# Patient Record
Sex: MALE | Race: WHITE | NOT HISPANIC OR LATINO | Employment: FULL TIME | URBAN - METROPOLITAN AREA
[De-identification: names, ages, dates, MRNs, and addresses within clinical notes are randomized per-mention and may not be internally consistent; named-entity substitution may affect disease eponyms.]

---

## 2022-04-13 ENCOUNTER — OCCUPATIONAL HEALTH (OUTPATIENT)
Dept: URGENT CARE | Facility: CLINIC | Age: 32
End: 2022-04-13
Payer: COMMERCIAL

## 2022-04-13 DIAGNOSIS — Z23 ENCOUNTER FOR IMMUNIZATION: Primary | ICD-10-CM

## 2022-04-13 PROCEDURE — 90746 HEPATITIS B VACCINE ADULT IM: ICD-10-PCS | Mod: S$GLB,,,

## 2022-04-13 PROCEDURE — 90632 HEPA VACCINE ADULT IM: CPT | Mod: S$GLB,,,

## 2022-04-13 PROCEDURE — 90746 HEPB VACCINE 3 DOSE ADULT IM: CPT | Mod: S$GLB,,,

## 2022-04-13 PROCEDURE — 90632 HEPATITIS A VACCINE ADULT IM: ICD-10-PCS | Mod: S$GLB,,,

## 2022-05-13 ENCOUNTER — OCCUPATIONAL HEALTH (OUTPATIENT)
Dept: URGENT CARE | Facility: CLINIC | Age: 32
End: 2022-05-13

## 2022-05-13 DIAGNOSIS — Z13.9 ENCOUNTER FOR SCREENING: Primary | ICD-10-CM

## 2022-05-13 PROCEDURE — 90746 HEPATITIS B VACCINE ADULT IM: ICD-10-PCS | Mod: S$GLB,,, | Performed by: NURSE PRACTITIONER

## 2022-05-13 PROCEDURE — 90746 HEPB VACCINE 3 DOSE ADULT IM: CPT | Mod: S$GLB,,, | Performed by: NURSE PRACTITIONER

## 2022-09-06 ENCOUNTER — OFFICE VISIT (OUTPATIENT)
Dept: URGENT CARE | Facility: CLINIC | Age: 32
End: 2022-09-06
Payer: COMMERCIAL

## 2022-09-06 VITALS
HEIGHT: 71 IN | SYSTOLIC BLOOD PRESSURE: 147 MMHG | WEIGHT: 315 LBS | RESPIRATION RATE: 20 BRPM | OXYGEN SATURATION: 96 % | BODY MASS INDEX: 44.1 KG/M2 | HEART RATE: 89 BPM | DIASTOLIC BLOOD PRESSURE: 98 MMHG | TEMPERATURE: 99 F

## 2022-09-06 DIAGNOSIS — Z23 NEED FOR TETANUS BOOSTER: ICD-10-CM

## 2022-09-06 DIAGNOSIS — S60.312A ABRASION OF LEFT THUMB, INITIAL ENCOUNTER: Primary | ICD-10-CM

## 2022-09-06 LAB — BREATH ALCOHOL: 0

## 2022-09-06 PROCEDURE — 90714 TD VACC NO PRESV 7 YRS+ IM: CPT | Mod: AT,S$GLB,, | Performed by: EMERGENCY MEDICINE

## 2022-09-06 PROCEDURE — 82075 POCT BREATH ALCOHOL TEST: ICD-10-PCS | Mod: S$GLB,,, | Performed by: EMERGENCY MEDICINE

## 2022-09-06 PROCEDURE — 90471 IMMUNIZATION ADMIN: CPT | Mod: AT,S$GLB,, | Performed by: EMERGENCY MEDICINE

## 2022-09-06 PROCEDURE — 80305 DRUG TEST PRSMV DIR OPT OBS: CPT | Mod: QW,S$GLB,, | Performed by: EMERGENCY MEDICINE

## 2022-09-06 PROCEDURE — 80305 OOH COLLECTION ONLY DRUG SCREEN: ICD-10-PCS | Mod: QW,S$GLB,, | Performed by: EMERGENCY MEDICINE

## 2022-09-06 PROCEDURE — 99203 PR OFFICE/OUTPT VISIT, NEW, LEVL III, 30-44 MIN: ICD-10-PCS | Mod: 25,S$GLB,, | Performed by: EMERGENCY MEDICINE

## 2022-09-06 PROCEDURE — 99199 UNLISTED SPECIAL SVC PX/RPRT: CPT | Mod: S$GLB,,, | Performed by: EMERGENCY MEDICINE

## 2022-09-06 PROCEDURE — 90471 TD VACCINE GREATER THAN OR EQUAL TO 7YO WITH PRESERVATIVE IM: ICD-10-PCS | Mod: AT,S$GLB,, | Performed by: EMERGENCY MEDICINE

## 2022-09-06 PROCEDURE — 99203 OFFICE O/P NEW LOW 30 MIN: CPT | Mod: 25,S$GLB,, | Performed by: EMERGENCY MEDICINE

## 2022-09-06 PROCEDURE — 82075 ASSAY OF BREATH ETHANOL: CPT | Mod: S$GLB,,, | Performed by: EMERGENCY MEDICINE

## 2022-09-06 PROCEDURE — 90714 TD VACCINE GREATER THAN OR EQUAL TO 7YO WITH PRESERVATIVE IM: ICD-10-PCS | Mod: AT,S$GLB,, | Performed by: EMERGENCY MEDICINE

## 2022-09-06 PROCEDURE — 99199 OCC MED MRO FEE: ICD-10-PCS | Mod: S$GLB,,, | Performed by: EMERGENCY MEDICINE

## 2022-09-06 NOTE — LETTER
[facility] - Occupational Health  [address] [address]  Phone: [phone]  Fax: [phone]  [facility]: [id number]    Pt Name: Prabhakar Moss Jr.  Injury Date: 09/06/2022   Employee ID: [id number] Date of First Treatment: 09/06/2022   Company: [employer]      Appointment Time: 01:42 PM Arrived: 1:42 PM   Provider: Jose Armando Yost MD Time Out: 3:45 PM     Office Treatment:   1. Abrasion of left thumb, initial encounter    2. Need for tetanus booster          Patient Instructions: Attention not to aggravate affected area    Restrictions: Regular Duty     Return As needed. BRENDEN

## 2022-09-06 NOTE — PROGRESS NOTES
Subjective:       Patient ID: Prabhakar Moss Jr. is a 31 y.o. male.    Chief Complaint: Hand Injury (Left thumb)    New workers comp injury DOI 9/6/2022 Patient was down in  a can helping a  when his left thumb got caught on the cooling fan of the motor. Last TD is unknown. RHD. Pain 0/10.     Patient is a right-handed 31-year-old male who reports a no abrasion/laceration to the left thumb.  Unknown last tetanus shot.  Physical exam shows an abrasion linear from the base of the nail to the metacarpophalangeal region without any laceration or foreign body.  Wound cleaned with Betadine and covered with dressing.  He has no bony pain and normal range of motion.  Tetanus shot updated in clinic and he has been instructed to wash with soap and water and use over-the-counter Neosporin as needed and to return for any concerns.  He will work regular duty without restrictions.    Hand Injury   His dominant hand is their right hand. The incident occurred 1 to 3 hours ago. The incident occurred at work. The injury mechanism was a direct blow. The pain is present in the left fingers. The pain does not radiate. The pain is at a severity of 0/10. Pertinent negatives include no numbness. The treatment provided no relief.     ROS    Musculoskeletal:  Positive for pain. Negative for abnormal ROM of joint, muscle cramps and muscle ache.   Skin:  Positive for wound, abrasion and laceration. Negative for color change, skin thickening/induration, puncture wound, erythema and bruising.   Neurological:  Negative for numbness and tingling.      Objective:      Physical Exam  Vitals and nursing note reviewed.   Constitutional:       Appearance: He is well-developed.   HENT:      Head: Normocephalic and atraumatic. No abrasion, contusion or laceration.      Right Ear: External ear normal.      Left Ear: External ear normal.      Nose: Nose normal.   Eyes:      General: Lids are normal.      Conjunctiva/sclera: Conjunctivae normal.       Pupils: Pupils are equal, round, and reactive to light.   Neck:      Trachea: Trachea and phonation normal.   Cardiovascular:      Rate and Rhythm: Normal rate and regular rhythm.      Heart sounds: Normal heart sounds.   Pulmonary:      Effort: Pulmonary effort is normal. No respiratory distress.      Breath sounds: Normal breath sounds. No stridor.   Musculoskeletal:         General: Tenderness and signs of injury present. No swelling. Normal range of motion.      Cervical back: Full passive range of motion without pain and neck supple.      Comments: NO BONY PAIN WITH NORMAL RANGE OF MOTION, STRENGTH, SENSATION.  THERE IS AN ABRASION TO THE DORSAL ASPECT OF THE LEFT THUMB FROM THE BASE OF THE NAIL TO THE MID PROXIMAL PHALANGEAL AREA HOWEVER SUPERFICIAL WITH NO SPLITTING OR LACERATION AND NO FOREIGN BODY NOTED.   Skin:     General: Skin is warm and dry.      Capillary Refill: Capillary refill takes less than 2 seconds.      Findings: No abrasion, bruising, burn, ecchymosis, erythema, laceration, lesion or rash.   Neurological:      Mental Status: He is alert and oriented to person, place, and time.   Psychiatric:         Speech: Speech normal.         Behavior: Behavior normal.         Thought Content: Thought content normal.         Judgment: Judgment normal.       Assessment:       1. Abrasion of left thumb, initial encounter    2. Need for tetanus booster          Plan:       Patient is a right-handed 31-year-old male who reports a no abrasion/laceration to the left thumb.  Unknown last tetanus shot.  Physical exam shows an abrasion linear from the base of the nail to the metacarpophalangeal region without any laceration or foreign body.  Wound cleaned with Betadine and covered with dressing.  He has no bony pain and normal range of motion.  Tetanus shot updated in clinic and he has been instructed to wash with soap and water and use over-the-counter Neosporin as needed and to return for any concerns.  He  will work regular duty without restrictions.     Patient Instructions: Attention not to aggravate affected area   Restrictions: Regular Duty  Follow up if symptoms worsen or fail to improve.

## 2022-10-13 ENCOUNTER — OCCUPATIONAL HEALTH (OUTPATIENT)
Dept: URGENT CARE | Facility: CLINIC | Age: 32
End: 2022-10-13

## 2022-10-13 DIAGNOSIS — Z23 ENCOUNTER FOR IMMUNIZATION: Primary | ICD-10-CM

## 2022-10-13 PROCEDURE — 90746 HEPB VACCINE 3 DOSE ADULT IM: CPT | Mod: S$GLB,,, | Performed by: EMERGENCY MEDICINE

## 2022-10-13 PROCEDURE — 90746 HEPATITIS B VACCINE ADULT IM: ICD-10-PCS | Mod: S$GLB,,, | Performed by: EMERGENCY MEDICINE

## 2022-10-13 PROCEDURE — 90632 HEPA VACCINE ADULT IM: CPT | Mod: S$GLB,,, | Performed by: EMERGENCY MEDICINE

## 2022-10-13 PROCEDURE — 90632 HEPATITIS A VACCINE ADULT IM: ICD-10-PCS | Mod: S$GLB,,, | Performed by: EMERGENCY MEDICINE

## 2022-11-17 ENCOUNTER — OCCUPATIONAL HEALTH (OUTPATIENT)
Dept: URGENT CARE | Facility: CLINIC | Age: 32
End: 2022-11-17

## 2022-11-17 DIAGNOSIS — Z13.9 ENCOUNTER FOR SCREENING: Primary | ICD-10-CM

## 2022-11-17 PROCEDURE — 86706 HEP B SURFACE ANTIBODY: CPT | Mod: S$GLB,,,

## 2022-11-17 PROCEDURE — 86706 HEPATITIS PANEL, ACUTE: ICD-10-PCS | Mod: S$GLB,,,

## 2023-01-11 ENCOUNTER — OCCUPATIONAL HEALTH (OUTPATIENT)
Dept: URGENT CARE | Facility: CLINIC | Age: 33
End: 2023-01-11
Payer: COMMERCIAL

## 2023-01-11 DIAGNOSIS — Z13.9 ENCOUNTER FOR SCREENING: Primary | ICD-10-CM

## 2023-01-11 PROCEDURE — 80305 DRUG TEST PRSMV DIR OPT OBS: CPT | Mod: S$GLB,,, | Performed by: NURSE PRACTITIONER

## 2023-01-11 PROCEDURE — 99199 UNLISTED SPECIAL SVC PX/RPRT: CPT | Mod: S$GLB,,, | Performed by: NURSE PRACTITIONER

## 2023-01-11 PROCEDURE — 80305 OOH COLLECTION ONLY DRUG SCREEN: ICD-10-PCS | Mod: S$GLB,,, | Performed by: NURSE PRACTITIONER

## 2023-01-11 PROCEDURE — 99199 OCC MED MRO FEE: ICD-10-PCS | Mod: S$GLB,,, | Performed by: NURSE PRACTITIONER

## 2023-01-27 ENCOUNTER — OFFICE VISIT (OUTPATIENT)
Dept: PRIMARY CARE CLINIC | Facility: CLINIC | Age: 33
End: 2023-01-27
Payer: COMMERCIAL

## 2023-01-27 VITALS
TEMPERATURE: 98 F | WEIGHT: 315 LBS | SYSTOLIC BLOOD PRESSURE: 172 MMHG | DIASTOLIC BLOOD PRESSURE: 92 MMHG | RESPIRATION RATE: 18 BRPM | OXYGEN SATURATION: 98 % | BODY MASS INDEX: 44.1 KG/M2 | HEIGHT: 71 IN | HEART RATE: 88 BPM

## 2023-01-27 DIAGNOSIS — Z11.4 ENCOUNTER FOR SCREENING FOR HIV: ICD-10-CM

## 2023-01-27 DIAGNOSIS — Z13.6 ENCOUNTER FOR SCREENING FOR CARDIOVASCULAR DISORDERS: ICD-10-CM

## 2023-01-27 DIAGNOSIS — R03.0 BLOOD PRESSURE ELEVATED WITHOUT HISTORY OF HTN: ICD-10-CM

## 2023-01-27 DIAGNOSIS — Z76.89 ENCOUNTER TO ESTABLISH CARE: Primary | ICD-10-CM

## 2023-01-27 DIAGNOSIS — Z23 NEED FOR VACCINATION: ICD-10-CM

## 2023-01-27 DIAGNOSIS — Z11.59 NEED FOR HEPATITIS C SCREENING TEST: ICD-10-CM

## 2023-01-27 DIAGNOSIS — Z00.00 ANNUAL PHYSICAL EXAM: ICD-10-CM

## 2023-01-27 LAB
BILIRUB UR QL STRIP: NEGATIVE
CLARITY UR: CLEAR
COLOR UR: YELLOW
GLUCOSE UR QL STRIP: NEGATIVE
HGB UR QL STRIP: NEGATIVE
KETONES UR QL STRIP: NEGATIVE
LEUKOCYTE ESTERASE UR QL STRIP: NEGATIVE
NITRITE UR QL STRIP: NEGATIVE
PH UR STRIP: 7 [PH] (ref 5–8)
PROT UR QL STRIP: NEGATIVE
SP GR UR STRIP: 1.02 (ref 1–1.03)
URN SPEC COLLECT METH UR: NORMAL
UROBILINOGEN UR STRIP-ACNC: NEGATIVE EU/DL

## 2023-01-27 PROCEDURE — 99999 PR PBB SHADOW E&M-EST. PATIENT-LVL IV: ICD-10-PCS | Mod: PBBFAC,,, | Performed by: STUDENT IN AN ORGANIZED HEALTH CARE EDUCATION/TRAINING PROGRAM

## 2023-01-27 PROCEDURE — 3077F SYST BP >= 140 MM HG: CPT | Mod: CPTII,S$GLB,, | Performed by: STUDENT IN AN ORGANIZED HEALTH CARE EDUCATION/TRAINING PROGRAM

## 2023-01-27 PROCEDURE — 99999 PR PBB SHADOW E&M-EST. PATIENT-LVL IV: CPT | Mod: PBBFAC,,, | Performed by: STUDENT IN AN ORGANIZED HEALTH CARE EDUCATION/TRAINING PROGRAM

## 2023-01-27 PROCEDURE — 1160F RVW MEDS BY RX/DR IN RCRD: CPT | Mod: CPTII,S$GLB,, | Performed by: STUDENT IN AN ORGANIZED HEALTH CARE EDUCATION/TRAINING PROGRAM

## 2023-01-27 PROCEDURE — 3008F PR BODY MASS INDEX (BMI) DOCUMENTED: ICD-10-PCS | Mod: CPTII,S$GLB,, | Performed by: STUDENT IN AN ORGANIZED HEALTH CARE EDUCATION/TRAINING PROGRAM

## 2023-01-27 PROCEDURE — 93005 EKG 12-LEAD: ICD-10-PCS | Mod: S$GLB,,, | Performed by: STUDENT IN AN ORGANIZED HEALTH CARE EDUCATION/TRAINING PROGRAM

## 2023-01-27 PROCEDURE — 3077F PR MOST RECENT SYSTOLIC BLOOD PRESSURE >= 140 MM HG: ICD-10-PCS | Mod: CPTII,S$GLB,, | Performed by: STUDENT IN AN ORGANIZED HEALTH CARE EDUCATION/TRAINING PROGRAM

## 2023-01-27 PROCEDURE — 1160F PR REVIEW ALL MEDS BY PRESCRIBER/CLIN PHARMACIST DOCUMENTED: ICD-10-PCS | Mod: CPTII,S$GLB,, | Performed by: STUDENT IN AN ORGANIZED HEALTH CARE EDUCATION/TRAINING PROGRAM

## 2023-01-27 PROCEDURE — 93005 ELECTROCARDIOGRAM TRACING: CPT | Mod: S$GLB,,, | Performed by: STUDENT IN AN ORGANIZED HEALTH CARE EDUCATION/TRAINING PROGRAM

## 2023-01-27 PROCEDURE — 3080F DIAST BP >= 90 MM HG: CPT | Mod: CPTII,S$GLB,, | Performed by: STUDENT IN AN ORGANIZED HEALTH CARE EDUCATION/TRAINING PROGRAM

## 2023-01-27 PROCEDURE — 90471 IMMUNIZATION ADMIN: CPT | Mod: S$GLB,,, | Performed by: STUDENT IN AN ORGANIZED HEALTH CARE EDUCATION/TRAINING PROGRAM

## 2023-01-27 PROCEDURE — 90471 FLU VACCINE (QUAD) GREATER THAN OR EQUAL TO 3YO PRESERVATIVE FREE IM: ICD-10-PCS | Mod: S$GLB,,, | Performed by: STUDENT IN AN ORGANIZED HEALTH CARE EDUCATION/TRAINING PROGRAM

## 2023-01-27 PROCEDURE — 3008F BODY MASS INDEX DOCD: CPT | Mod: CPTII,S$GLB,, | Performed by: STUDENT IN AN ORGANIZED HEALTH CARE EDUCATION/TRAINING PROGRAM

## 2023-01-27 PROCEDURE — 1159F MED LIST DOCD IN RCRD: CPT | Mod: CPTII,S$GLB,, | Performed by: STUDENT IN AN ORGANIZED HEALTH CARE EDUCATION/TRAINING PROGRAM

## 2023-01-27 PROCEDURE — 99395 PREV VISIT EST AGE 18-39: CPT | Mod: 25,S$GLB,, | Performed by: STUDENT IN AN ORGANIZED HEALTH CARE EDUCATION/TRAINING PROGRAM

## 2023-01-27 PROCEDURE — 1159F PR MEDICATION LIST DOCUMENTED IN MEDICAL RECORD: ICD-10-PCS | Mod: CPTII,S$GLB,, | Performed by: STUDENT IN AN ORGANIZED HEALTH CARE EDUCATION/TRAINING PROGRAM

## 2023-01-27 PROCEDURE — 90686 FLU VACCINE (QUAD) GREATER THAN OR EQUAL TO 3YO PRESERVATIVE FREE IM: ICD-10-PCS | Mod: S$GLB,,, | Performed by: STUDENT IN AN ORGANIZED HEALTH CARE EDUCATION/TRAINING PROGRAM

## 2023-01-27 PROCEDURE — 93010 ELECTROCARDIOGRAM REPORT: CPT | Mod: S$GLB,,, | Performed by: INTERNAL MEDICINE

## 2023-01-27 PROCEDURE — 3080F PR MOST RECENT DIASTOLIC BLOOD PRESSURE >= 90 MM HG: ICD-10-PCS | Mod: CPTII,S$GLB,, | Performed by: STUDENT IN AN ORGANIZED HEALTH CARE EDUCATION/TRAINING PROGRAM

## 2023-01-27 PROCEDURE — 99395 PR PREVENTIVE VISIT,EST,18-39: ICD-10-PCS | Mod: 25,S$GLB,, | Performed by: STUDENT IN AN ORGANIZED HEALTH CARE EDUCATION/TRAINING PROGRAM

## 2023-01-27 PROCEDURE — 90686 IIV4 VACC NO PRSV 0.5 ML IM: CPT | Mod: S$GLB,,, | Performed by: STUDENT IN AN ORGANIZED HEALTH CARE EDUCATION/TRAINING PROGRAM

## 2023-01-27 PROCEDURE — 93010 EKG 12-LEAD: ICD-10-PCS | Mod: S$GLB,,, | Performed by: INTERNAL MEDICINE

## 2023-01-27 NOTE — PROGRESS NOTES
Subjective:       Patient ID: Prabhakar Moss Jr. is a 32 y.o. male.    Chief Complaint: Establish Care    HPI:  32 y.o. male presents to [facility] to establish care    Acute concerns?: Patient reports none today. See that blood pressure was high and 1 year looked OK. Does run in family.      History of HTN: No  Home BP log?: No  Medications: Never  Compliance?: N/A    Diet?: Will start power lifting diet, high protein and low carb  Exercise?: Powerlifting, [place] until gas went up    Patient snores during sleep. No reported apneic episodes. Does feel tired during day. Feels due to not getting enough sleep. Will stay up too late after exercise.    Last PCP?: Dr. Woods  Allergies:  NKDA  Medical History: Asthma grew out of  Medications: Pre-workout, creatine  Surgical History: tonsillectomy, left shoulder labrum repair, left knee ACL mensicus and cartilage damage.  Family History: maternal grandmother breast cancer, paternal grandfather lung cancer; no known autoimmune disease  Social History: Smokes cigars 1-2 times per year; no illicits; EtOH a beew 1-2 times weekly    Fasting?: No  Hep C/HIV screening?: Amenable  Flu vaccine?: Amenable    Review of Systems   Constitutional:  Negative for chills, diaphoresis, fatigue and fever.   HENT:  Negative for congestion, sinus pressure, sneezing and sore throat.    Respiratory:  Negative for cough and shortness of breath.    Cardiovascular:  Negative for chest pain and palpitations.   Gastrointestinal:  Negative for abdominal pain, diarrhea, nausea and vomiting.   Musculoskeletal:  Negative for arthralgias, joint swelling and myalgias.   Skin:  Negative for rash and wound.   Neurological:  Positive for numbness (from shoulder to median nerve distribution to hands typicalluy when waking. Shaking arm can help resolve). Negative for dizziness and weakness.     Objective:      Vitals:    01/27/23 1306   BP: (!) 178/90   BP Location: Left arm   Patient Position: Sitting  "  BP Method: Medium (Manual)   Pulse: 88   Resp: 18   Temp: 97.7 °F (36.5 °C)   TempSrc: Temporal   SpO2: 98%   Weight: (!) 177.2 kg (390 lb 8.7 oz)   Height: 5' 11" (1.803 m)     Physical Exam  Vitals reviewed.   Constitutional:       General: He is not in acute distress.     Appearance: Normal appearance. He is not ill-appearing.   HENT:      Head: Normocephalic and atraumatic.   Eyes:      General:         Right eye: No discharge.         Left eye: No discharge.      Conjunctiva/sclera: Conjunctivae normal.   Cardiovascular:      Rate and Rhythm: Normal rate and regular rhythm.      Pulses: Normal pulses.      Heart sounds: No murmur heard.  Pulmonary:      Effort: Pulmonary effort is normal.      Breath sounds: Normal breath sounds.   Musculoskeletal:         General: No deformity.      Cervical back: Neck supple. No rigidity.   Lymphadenopathy:      Cervical: No cervical adenopathy.   Skin:     General: Skin is warm and dry.      Coloration: Skin is not jaundiced.   Neurological:      General: No focal deficit present.      Mental Status: He is alert and oriented to person, place, and time.   Psychiatric:         Mood and Affect: Mood normal.         Behavior: Behavior normal.           No results found for: NA, K, CL, CO2, BUN, CREATININE, GLUCOSE, ANIONGAP  No results found for: HGBA1C  No results found for: BNP, BNPTRIAGEBLO    No results found for: WBC, HGB, HCT, PLT, GRAN  No results found for: CHOL, HDL, LDLCALC, TRIG     No current outpatient medications on file.        Assessment:       1. Encounter to establish care    2. Encounter for screening for cardiovascular disorders    3. Annual physical exam    4. Blood pressure elevated without history of HTN    5. Need for hepatitis C screening test    6. Encounter for screening for HIV    7. Need for vaccination           Plan:       Encounter to establish care  Annual physical exam  Blood pressure elevated without history of HTN  Encounter for screening " for cardiovascular disorders  -     Lipid Panel; Future; Expected date: 01/27/2023  -     CBC Auto Differential; Future; Expected date: 01/27/2023  -     Comprehensive Metabolic Panel; Future; Expected date: 01/27/2023  -     EKG 12-lead  -     URINALYSIS; Future; Expected date: 01/27/2023  -     TSH; Future; Expected date: 01/27/2023  - Encouraged home monitoring until seen in clinic for repeat BP    Need for hepatitis C screening test  -     Hepatitis C Antibody; Future; Expected date: 01/27/2023    Encounter for screening for HIV  -     HIV 1/2 Ag/Ab (4th Gen); Future; Expected date: 01/27/2023    Need for vaccination  -     Influenza - Quadrivalent (PF)    RTC in 4 weeks

## 2023-02-23 ENCOUNTER — OFFICE VISIT (OUTPATIENT)
Dept: URGENT CARE | Facility: CLINIC | Age: 33
End: 2023-02-23
Payer: COMMERCIAL

## 2023-02-23 VITALS
HEART RATE: 82 BPM | TEMPERATURE: 98 F | HEIGHT: 70 IN | OXYGEN SATURATION: 98 % | BODY MASS INDEX: 45.1 KG/M2 | WEIGHT: 315 LBS | RESPIRATION RATE: 18 BRPM | SYSTOLIC BLOOD PRESSURE: 136 MMHG | DIASTOLIC BLOOD PRESSURE: 80 MMHG

## 2023-02-23 DIAGNOSIS — S01.81XA LACERATION OF EYEBROW AND FOREHEAD, LEFT, INITIAL ENCOUNTER: Primary | ICD-10-CM

## 2023-02-23 DIAGNOSIS — S01.112A LACERATION OF EYEBROW AND FOREHEAD, LEFT, INITIAL ENCOUNTER: Primary | ICD-10-CM

## 2023-02-23 DIAGNOSIS — Z02.6 ENCOUNTER RELATED TO WORKER'S COMPENSATION CLAIM: ICD-10-CM

## 2023-02-23 DIAGNOSIS — S09.90XA HEAD INJURY WITHOUT CONCUSSION OR INTRACRANIAL HEMORRHAGE, INITIAL ENCOUNTER: ICD-10-CM

## 2023-02-23 PROCEDURE — 12011 RPR F/E/E/N/L/M 2.5 CM/<: CPT | Mod: S$GLB,,, | Performed by: PHYSICIAN ASSISTANT

## 2023-02-23 PROCEDURE — 99205 PR OFFICE/OUTPT VISIT, NEW, LEVL V, 60-74 MIN: ICD-10-PCS | Mod: 25,S$GLB,, | Performed by: PHYSICIAN ASSISTANT

## 2023-02-23 PROCEDURE — 99205 OFFICE O/P NEW HI 60 MIN: CPT | Mod: 25,S$GLB,, | Performed by: PHYSICIAN ASSISTANT

## 2023-02-23 PROCEDURE — 12011 LACERATION REPAIR: ICD-10-PCS | Mod: S$GLB,,, | Performed by: PHYSICIAN ASSISTANT

## 2023-02-23 NOTE — PROGRESS NOTES
Subjective:       Patient ID: Prabhakar Moss Jr. is a 32 y.o. male.    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 Pain score 2/10 with complaints of Laceration, Bleeding, No nausea, No dizziness, No blurred vision. SH    32-year-old male presents today with laceration above his left eyebrow.  Laceration approximately 1 cm.  Member was working trying to loosen valve with a large wrench when his foot slipped and he smashed his head against the large motor.  Had immediate pain without loss of consciousness, dizziness or lightheadedness.  Controlled bleeding with direct pressure.  Supervisor sent him here for evaluation and sutures.  Denies any lightheadedness, dizziness, nausea, vomiting, blurry vision, headache.    Head Injury   Pertinent negatives include no disorientation or numbness.   Constitution: Negative. Negative for chills, sweating, fatigue and fever.   HENT: Negative.     Neck: neck negative.   Cardiovascular: Negative.  Negative for chest pain.   Eyes: Negative.    Respiratory: Negative.  Negative for chest tightness and shortness of breath.    Gastrointestinal: Negative.    Endocrine: negative.   Genitourinary: Negative.    Skin:  Positive for laceration (Left eyebrow) and erythema. Negative for hives.   Allergic/Immunologic: Negative.  Negative for environmental allergies, hives, itching and sneezing.   Neurological: Negative.  Negative for dizziness, light-headedness, disorientation, altered mental status, numbness and tingling.   Hematologic/Lymphatic: Negative.    Psychiatric/Behavioral: Negative.  Negative for altered mental status, disorientation, confusion, agitation and nervous/anxious. The patient is not nervous/anxious.        Objective:      Physical Exam  Vitals and nursing note reviewed.   Constitutional:       General: He is awake. He is not in acute distress.     Appearance: Normal appearance. He is well-developed and normal weight. He is not ill-appearing, toxic-appearing or diaphoretic.   HENT:      Head: Normocephalic and atraumatic.        Right Ear: External ear normal.      Left Ear: External ear normal.      Nose: Nose normal.      Mouth/Throat:      Mouth: Mucous membranes are moist.   Eyes:      Conjunctiva/sclera: Conjunctivae normal.      Pupils: Pupils are equal, round, and reactive to light.   Cardiovascular:      Rate and Rhythm: Normal rate and regular rhythm.      Pulses: Normal pulses.      Heart sounds: Normal heart sounds.   Pulmonary:      Effort: Pulmonary effort is normal.      Breath sounds: Normal breath sounds.   Musculoskeletal:         General: Normal range of motion.      Cervical back: Normal range of motion and neck supple.   Skin:     General: Skin is warm and dry.      Capillary Refill: Capillary refill takes less than 2 seconds.      Findings: Erythema and laceration (over left eyebrow) present.   Neurological:      Mental Status: He is alert.   Psychiatric:         Behavior: Behavior is cooperative.           Assessment:       1. Laceration of eyebrow and forehead, left, initial encounter    2. Encounter related to worker's compensation claim    3. Head injury without concussion or intracranial hemorrhage, initial encounter          Plan:            Patient Instructions: Attention not to aggravate affected area, Apply ice 24-48 hours then apply heat/warm soaks   Restrictions: Regular Duty  Follow up in about 1 week (around 3/2/2023).      1 cm laceration to area just above left eyebrow.  Closed with 3 5-0 Prolene sutures.  Discussed keeping area clean and dry, utilize Band-Aid.  Have patient come back in 7 days for suture removal to allow for best scarring possible.  Utilize ibuprofen for any  discomfort.  Patient denies loss of consciousness or headache, continue to monitor and follow-up if symptoms develop.    There are no Patient Instructions on file for this visit.      ANA PAULA Vargas

## 2023-02-23 NOTE — PROCEDURES
Laceration Repair    Date/Time: 2023 11:00 AM  Performed by: ANA PAULA Trinidad Jr.  Authorized by: ANA PAULA Trinidad Jr.   Consent Done: Yes  Consent: Verbal consent obtained.  Risks and benefits: risks, benefits and alternatives were discussed  Consent given by: patient  Patient understanding: patient states understanding of the procedure being performed  Patient consent: the patient's understanding of the procedure matches consent given  Procedure consent: procedure consent matches procedure scheduled  Relevant documents: relevant documents present and verified  Test results: test results available and properly labeled  Site marked: the operative site was marked  Imaging studies: imaging studies available  Patient identity confirmed:  and name  Body area: head/neck  Location details: left eyebrow  Laceration length: 1 cm  Tendon involvement: none  Nerve involvement: none  Vascular damage: no  Anesthesia: local infiltration    Anesthesia:  Local Anesthetic: lidocaine 1% without epinephrine  Anesthetic total: 2 mL  Preparation: Patient was prepped and draped in the usual sterile fashion.  Irrigation solution: saline  Irrigation method: syringe  Amount of cleaning: standard  Debridement: none  Degree of undermining: none  Skin closure: 5-0 Prolene  Number of sutures: 3  Technique: simple  Approximation: loose  Approximation difficulty: simple  Patient tolerance: Patient tolerated the procedure well with no immediate complications

## 2023-02-23 NOTE — LETTER
[facility] Occupational Health  [address] [address]  Phone: [phone]  Fax: [phone]  [facility]: [email or web address]    Pt Name: Prabhakar Moss Jr.  Injury Date: 02/23/2023   Employee ID: [id number] Date of First Treatment: 02/23/2023   Company: [employer]      Appointment Time: 10:45 AM Arrived: 11:00am   Provider: ANA PAULA Vargas Time Out: 1:10pm     Office Treatment:   1. Encounter related to worker's compensation claim    2. Laceration of eyebrow and forehead, left, initial encounter    3. Head injury without concussion or intracranial hemorrhage, initial encounter          Patient Instructions: Attention not to aggravate affected area, Apply ice 24-48 hours then apply heat/warm soaks      Restrictions: Regular Duty     Return Appointment: 3/2/2023 at 9:00am.  EC

## 2023-02-24 ENCOUNTER — PATIENT MESSAGE (OUTPATIENT)
Dept: RESEARCH | Facility: HOSPITAL | Age: 33
End: 2023-02-24
Payer: COMMERCIAL

## 2023-02-27 ENCOUNTER — OFFICE VISIT (OUTPATIENT)
Dept: PRIMARY CARE CLINIC | Facility: CLINIC | Age: 33
End: 2023-02-27
Payer: COMMERCIAL

## 2023-02-27 VITALS
BODY MASS INDEX: 45.1 KG/M2 | WEIGHT: 315 LBS | DIASTOLIC BLOOD PRESSURE: 104 MMHG | RESPIRATION RATE: 18 BRPM | SYSTOLIC BLOOD PRESSURE: 174 MMHG | OXYGEN SATURATION: 98 % | HEART RATE: 80 BPM | TEMPERATURE: 98 F | HEIGHT: 70 IN

## 2023-02-27 DIAGNOSIS — M62.838 NOCTURNAL MUSCLE SPASM: ICD-10-CM

## 2023-02-27 DIAGNOSIS — I10 BENIGN ESSENTIAL HTN: Primary | ICD-10-CM

## 2023-02-27 PROCEDURE — 99999 PR PBB SHADOW E&M-EST. PATIENT-LVL IV: CPT | Mod: PBBFAC,,, | Performed by: STUDENT IN AN ORGANIZED HEALTH CARE EDUCATION/TRAINING PROGRAM

## 2023-02-27 PROCEDURE — 99999 PR PBB SHADOW E&M-EST. PATIENT-LVL IV: ICD-10-PCS | Mod: PBBFAC,,, | Performed by: STUDENT IN AN ORGANIZED HEALTH CARE EDUCATION/TRAINING PROGRAM

## 2023-02-27 PROCEDURE — 3080F DIAST BP >= 90 MM HG: CPT | Mod: CPTII,S$GLB,, | Performed by: STUDENT IN AN ORGANIZED HEALTH CARE EDUCATION/TRAINING PROGRAM

## 2023-02-27 PROCEDURE — 99214 PR OFFICE/OUTPT VISIT, EST, LEVL IV, 30-39 MIN: ICD-10-PCS | Mod: S$GLB,,, | Performed by: STUDENT IN AN ORGANIZED HEALTH CARE EDUCATION/TRAINING PROGRAM

## 2023-02-27 PROCEDURE — 3077F SYST BP >= 140 MM HG: CPT | Mod: CPTII,S$GLB,, | Performed by: STUDENT IN AN ORGANIZED HEALTH CARE EDUCATION/TRAINING PROGRAM

## 2023-02-27 PROCEDURE — 1160F RVW MEDS BY RX/DR IN RCRD: CPT | Mod: CPTII,S$GLB,, | Performed by: STUDENT IN AN ORGANIZED HEALTH CARE EDUCATION/TRAINING PROGRAM

## 2023-02-27 PROCEDURE — 1159F MED LIST DOCD IN RCRD: CPT | Mod: CPTII,S$GLB,, | Performed by: STUDENT IN AN ORGANIZED HEALTH CARE EDUCATION/TRAINING PROGRAM

## 2023-02-27 PROCEDURE — 1159F PR MEDICATION LIST DOCUMENTED IN MEDICAL RECORD: ICD-10-PCS | Mod: CPTII,S$GLB,, | Performed by: STUDENT IN AN ORGANIZED HEALTH CARE EDUCATION/TRAINING PROGRAM

## 2023-02-27 PROCEDURE — 3077F PR MOST RECENT SYSTOLIC BLOOD PRESSURE >= 140 MM HG: ICD-10-PCS | Mod: CPTII,S$GLB,, | Performed by: STUDENT IN AN ORGANIZED HEALTH CARE EDUCATION/TRAINING PROGRAM

## 2023-02-27 PROCEDURE — 3008F PR BODY MASS INDEX (BMI) DOCUMENTED: ICD-10-PCS | Mod: CPTII,S$GLB,, | Performed by: STUDENT IN AN ORGANIZED HEALTH CARE EDUCATION/TRAINING PROGRAM

## 2023-02-27 PROCEDURE — 1160F PR REVIEW ALL MEDS BY PRESCRIBER/CLIN PHARMACIST DOCUMENTED: ICD-10-PCS | Mod: CPTII,S$GLB,, | Performed by: STUDENT IN AN ORGANIZED HEALTH CARE EDUCATION/TRAINING PROGRAM

## 2023-02-27 PROCEDURE — 3008F BODY MASS INDEX DOCD: CPT | Mod: CPTII,S$GLB,, | Performed by: STUDENT IN AN ORGANIZED HEALTH CARE EDUCATION/TRAINING PROGRAM

## 2023-02-27 PROCEDURE — 3080F PR MOST RECENT DIASTOLIC BLOOD PRESSURE >= 90 MM HG: ICD-10-PCS | Mod: CPTII,S$GLB,, | Performed by: STUDENT IN AN ORGANIZED HEALTH CARE EDUCATION/TRAINING PROGRAM

## 2023-02-27 PROCEDURE — 99214 OFFICE O/P EST MOD 30 MIN: CPT | Mod: S$GLB,,, | Performed by: STUDENT IN AN ORGANIZED HEALTH CARE EDUCATION/TRAINING PROGRAM

## 2023-02-27 RX ORDER — AMLODIPINE BESYLATE 5 MG/1
5 TABLET ORAL DAILY
Qty: 30 TABLET | Refills: 11 | Status: SHIPPED | OUTPATIENT
Start: 2023-02-27 | End: 2023-03-13 | Stop reason: SDUPTHER

## 2023-02-27 NOTE — PROGRESS NOTES
"Subjective:       Patient ID: Prabhakar Moss Jr. is a 32 y.o. male.    Chief Complaint: Follow-up    HPI:  32 y.o. male presents to [facility] to establish care    Acute concerns?: Here for BP follow-up    HTN:  Home BP log?: today was 172/110 mmHg, recently got a new cuff  Medications: None  Compliance?: N/A  Diet?: Made modifications. Avoding salt.  Exercise?: Increased cardio exercise    Does snore when he sleeps   Has never been told stops breathing in sleep  Some daytime fatigue    EPWORTH SLEEPINESS SCALE 2/27/2023   Sitting and reading 0   Watching TV 2   Sitting, inactive in a public place (e.g. a theatre or a meeting) 0   As a passenger in a car for an hour without a break 3   Lying down to rest in the afternoon when circumstances permit 2   Sitting and talking to someone 0   Sitting quietly after a lunch without alcohol 0   In a car, while stopped for a few minutes in traffic 0   Total score 7         Reports that he has been having ryan horses at night to right calf over past week. Drinking fluids well, avoiding processed foods and excess carbs.    Review of Systems   Constitutional:  Negative for chills, diaphoresis, fatigue and fever.   HENT:  Negative for congestion, sinus pressure, sneezing and sore throat.    Respiratory:  Negative for cough and shortness of breath.    Cardiovascular:  Negative for chest pain and palpitations.   Gastrointestinal:  Negative for abdominal pain, diarrhea, nausea and vomiting.   Musculoskeletal:  Positive for myalgias (Right thigh). Negative for arthralgias and joint swelling.   Skin:  Negative for rash and wound.   Neurological:  Negative for dizziness and weakness.     Objective:      Vitals:    02/27/23 1308   BP: (!) 170/96   BP Location: Left arm   Patient Position: Sitting   BP Method: Medium (Manual)   Pulse: 80   Resp: 18   Temp: 97.6 °F (36.4 °C)   TempSrc: Temporal   SpO2: 98%   Weight: (!) 177.4 kg (391 lb 3.3 oz)   Height: 5' 9.5" (1.765 m) "     Physical Exam  Vitals reviewed.   Constitutional:       General: He is not in acute distress.     Appearance: Normal appearance. He is not ill-appearing.   HENT:      Head: Normocephalic and atraumatic.   Eyes:      General:         Right eye: No discharge.         Left eye: No discharge.      Conjunctiva/sclera: Conjunctivae normal.   Cardiovascular:      Rate and Rhythm: Normal rate.   Pulmonary:      Effort: Pulmonary effort is normal.   Musculoskeletal:         General: No deformity.   Skin:     Coloration: Skin is not jaundiced or pale.   Neurological:      General: No focal deficit present.      Mental Status: He is alert and oriented to person, place, and time.   Psychiatric:         Mood and Affect: Mood normal.         Behavior: Behavior normal.           Lab Results   Component Value Date     02/02/2023    K 4.1 02/02/2023     02/02/2023    CO2 24 02/02/2023    BUN 18 02/02/2023    CREATININE 1.0 02/02/2023    ANIONGAP 11 02/02/2023     No results found for: HGBA1C  No results found for: BNP, BNPTRIAGEBLO    Lab Results   Component Value Date    WBC 8.08 02/02/2023    HGB 14.1 02/02/2023    HCT 43.8 02/02/2023     02/02/2023    GRAN 4.8 02/02/2023    GRAN 59.9 02/02/2023     Lab Results   Component Value Date    CHOL 198 02/02/2023    HDL 35 (L) 02/02/2023    LDLCALC 133.0 02/02/2023    TRIG 150 02/02/2023          Current Outpatient Medications:     amLODIPine (NORVASC) 5 MG tablet, Take 1 tablet (5 mg total) by mouth once daily., Disp: 30 tablet, Rfl: 11        Assessment:       1. Benign essential HTN    2. Nocturnal muscle spasm           Plan:       Benign essential HTN  Nocturnal muscle spasm  -     amLODIPine (NORVASC) 5 MG tablet; Take 1 tablet (5 mg total) by mouth once daily.  Dispense: 30 tablet; Refill: 11  - Recommend good fluid intake and can attempt trial of OTC Mg supplement  - Diet foods with increased potassium provided today and recommended    RTC 2 weeks for BP  check  RTC 3 months

## 2023-03-02 ENCOUNTER — OFFICE VISIT (OUTPATIENT)
Dept: URGENT CARE | Facility: CLINIC | Age: 33
End: 2023-03-02
Payer: COMMERCIAL

## 2023-03-02 VITALS
TEMPERATURE: 98 F | WEIGHT: 315 LBS | BODY MASS INDEX: 45.1 KG/M2 | DIASTOLIC BLOOD PRESSURE: 82 MMHG | SYSTOLIC BLOOD PRESSURE: 152 MMHG | HEART RATE: 68 BPM | HEIGHT: 70 IN

## 2023-03-02 DIAGNOSIS — Z02.6 ENCOUNTER RELATED TO WORKER'S COMPENSATION CLAIM: ICD-10-CM

## 2023-03-02 DIAGNOSIS — Z48.02 VISIT FOR SUTURE REMOVAL: ICD-10-CM

## 2023-03-02 DIAGNOSIS — S09.90XD HEAD INJURY WITHOUT CONCUSSION OR INTRACRANIAL HEMORRHAGE, SUBSEQUENT ENCOUNTER: ICD-10-CM

## 2023-03-02 DIAGNOSIS — S01.81XD LACERATION OF EYEBROW AND FOREHEAD, LEFT, SUBSEQUENT ENCOUNTER: Primary | ICD-10-CM

## 2023-03-02 DIAGNOSIS — S01.112D LACERATION OF EYEBROW AND FOREHEAD, LEFT, SUBSEQUENT ENCOUNTER: Primary | ICD-10-CM

## 2023-03-02 PROCEDURE — 99024 SUTURE REMOVAL: ICD-10-PCS | Mod: S$GLB,,, | Performed by: PHYSICIAN ASSISTANT

## 2023-03-02 PROCEDURE — 99214 PR OFFICE/OUTPT VISIT, EST, LEVL IV, 30-39 MIN: ICD-10-PCS | Mod: S$GLB,,, | Performed by: PHYSICIAN ASSISTANT

## 2023-03-02 PROCEDURE — 99024 POSTOP FOLLOW-UP VISIT: CPT | Mod: S$GLB,,, | Performed by: PHYSICIAN ASSISTANT

## 2023-03-02 PROCEDURE — 99214 OFFICE O/P EST MOD 30 MIN: CPT | Mod: S$GLB,,, | Performed by: PHYSICIAN ASSISTANT

## 2023-03-02 NOTE — PROGRESS NOTES
Subjective:       Patient ID: Prabhakar Moss Jr. is a 32 y.o. male.    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 He is here today to have his stiches removed. He states that he has no pain just itching.     EC    32-year-old male returns for re-evaluation of laceration to left eyebrow and forehead.  Has been 7 days since sutures were applied.  Wound appears healed, no drainage, discharge or redness.  Has scab over surrounding tissue were sutures are located.  Three sutures of 5 0 Prolene in skin.  No complaints or issues    Laceration     Constitution: Negative. Negative for activity change, chills, sweating, fatigue and fever.   HENT: Negative.     Neck: neck negative.   Cardiovascular: Negative.  Negative for chest pain.   Eyes:  Negative for eye trauma, foreign body in eye, eye itching, eye pain, vision loss, double vision, blurred vision and eyelid swelling.   Respiratory: Negative.  Negative for chest tightness and shortness of breath.    Gastrointestinal: Negative.    Endocrine: negative.   Genitourinary: Negative.    Musculoskeletal:  Negative for pain.   Skin:  Positive for laceration. Negative for color change, erythema, bruising and hives.   Allergic/Immunologic: Negative.  Negative for environmental allergies, hives, itching and sneezing.   Neurological: Negative.  Negative for dizziness, light-headedness, disorientation, altered mental status, numbness, tingling and seizures.   Hematologic/Lymphatic: Negative.    Psychiatric/Behavioral: Negative.  Negative for altered mental status, disorientation, confusion, agitation and nervous/anxious. The patient is not nervous/anxious.       Objective:      Physical Exam  Vitals and nursing note reviewed.   Constitutional:       General: He is awake. He is not in acute distress.     Appearance: Normal appearance. He is well-developed and  normal weight. He is not ill-appearing, toxic-appearing or diaphoretic.   HENT:      Head: Normocephalic and atraumatic.        Right Ear: External ear normal.      Left Ear: External ear normal.      Nose: Nose normal.      Mouth/Throat:      Mouth: Mucous membranes are moist.   Eyes:      Conjunctiva/sclera: Conjunctivae normal.      Pupils: Pupils are equal, round, and reactive to light.   Cardiovascular:      Rate and Rhythm: Normal rate and regular rhythm.      Pulses: Normal pulses.      Heart sounds: Normal heart sounds.   Pulmonary:      Effort: Pulmonary effort is normal.      Breath sounds: Normal breath sounds.   Musculoskeletal:         General: Normal range of motion.      Cervical back: Normal range of motion and neck supple.   Skin:     General: Skin is warm and dry.      Capillary Refill: Capillary refill takes less than 2 seconds.      Findings: Laceration (healed lacerationr over left eyebrow) present. No erythema.   Neurological:      Mental Status: He is alert.   Psychiatric:         Behavior: Behavior is cooperative.       Assessment:       1. Laceration of eyebrow and forehead, left, subsequent encounter    2. Encounter related to worker's compensation claim    3. Head injury without concussion or intracranial hemorrhage, subsequent encounter    4. Visit for suture removal          Plan:                Restrictions: Regular Duty  Follow up in about 5 days (around 3/7/2023).      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    There are no Patient Instructions on file for this visit.      ANA PAULA Vargas

## 2023-03-02 NOTE — LETTER
[facility] Occupational Health  [address] [address]  Phone: [phone]  Fax: [phone]  [facility]: [id number]    Pt Name: Prabhakar Moss Jr.  Injury Date: 02/23/2023   Employee ID:  Date of Treatment: 03/02/2023   Company: [employer]      Appointment Time: 09:00 AM Arrived: 08:27 AM   Provider: ANA PAULA Vargas Time Out:09:00 AM     Office Treatment:   1. Encounter related to worker's compensation claim    2. Laceration of eyebrow and forehead, left, subsequent encounter    3. Head injury without concussion or intracranial hemorrhage, subsequent encounter    4. Visit for suture removal               Restrictions: Regular Duty     Return Appointment:     3/7/2023 at 09:00 AM     SH

## 2023-03-02 NOTE — PROGRESS NOTES
"Subjective:       Patient ID: Prabhakar Moss Jr. is a 32 y.o. male.    Vitals:  height is 5' 9.5" (1.765 m) and weight is 176.9 kg (390 lb) (abnormal). His oral temperature is 98 °F (36.7 °C). His blood pressure is 152/82 (abnormal) and his pulse is 68.     Chief Complaint: Laceration (Left eyebrow and forehead )    HPI  ROS    Objective:      Physical Exam      Assessment:       1. Laceration of eyebrow and forehead, left, subsequent encounter    2. Encounter related to worker's compensation claim    3. Head injury without concussion or intracranial hemorrhage, subsequent encounter    4. Visit for suture removal            Plan:         Laceration of eyebrow and forehead, left, subsequent encounter  -     Suture Removal    Encounter related to worker's compensation claim    Head injury without concussion or intracranial hemorrhage, subsequent encounter    Visit for suture removal  -     Suture Removal                     "

## 2023-03-02 NOTE — PROCEDURES
Suture Removal    Date/Time: 3/2/2023 9:00 AM  Location procedure was performed: [facility]  Performed by: ANA PAULA Trinidad Jr.  Authorized by: ANA PAULA Trinidad Jr.   Assisting provider: ANA PAULA Trinidad Jr.  Pre-operative diagnosis: laceration  Post-operative diagnosis: healed laceration  Body area: head/neck  Location details: left eyebrow  Description of findings: well healed wound with 3 5-0 prolene sutures   Wound Appearance: clean, well healed, normal color, no drainage and nontender  Sutures Removed: 3  Post-removal: no dressing applied  Facility: sutures placed in this facility  Technical procedures used: clean technique  Significant surgical tasks conducted by the assistant(s): none  Complications: No  Estimated blood loss (mL): 0  Specimens: No  Implants: No  Patient tolerance: Patient tolerated the procedure well with no immediate complications

## 2023-03-07 ENCOUNTER — OFFICE VISIT (OUTPATIENT)
Dept: URGENT CARE | Facility: CLINIC | Age: 33
End: 2023-03-07
Payer: COMMERCIAL

## 2023-03-07 VITALS
WEIGHT: 315 LBS | DIASTOLIC BLOOD PRESSURE: 91 MMHG | SYSTOLIC BLOOD PRESSURE: 172 MMHG | RESPIRATION RATE: 20 BRPM | TEMPERATURE: 98 F | BODY MASS INDEX: 45.1 KG/M2 | HEART RATE: 77 BPM | OXYGEN SATURATION: 95 % | HEIGHT: 70 IN

## 2023-03-07 DIAGNOSIS — S01.112D LACERATION OF EYEBROW AND FOREHEAD, LEFT, SUBSEQUENT ENCOUNTER: ICD-10-CM

## 2023-03-07 DIAGNOSIS — Z02.6 ENCOUNTER RELATED TO WORKER'S COMPENSATION CLAIM: Primary | ICD-10-CM

## 2023-03-07 DIAGNOSIS — S09.90XD HEAD INJURY WITHOUT CONCUSSION OR INTRACRANIAL HEMORRHAGE, SUBSEQUENT ENCOUNTER: ICD-10-CM

## 2023-03-07 DIAGNOSIS — S01.81XD LACERATION OF EYEBROW AND FOREHEAD, LEFT, SUBSEQUENT ENCOUNTER: ICD-10-CM

## 2023-03-07 PROCEDURE — 99213 OFFICE O/P EST LOW 20 MIN: CPT | Mod: S$GLB,,, | Performed by: PHYSICIAN ASSISTANT

## 2023-03-07 PROCEDURE — 99213 PR OFFICE/OUTPT VISIT, EST, LEVL III, 20-29 MIN: ICD-10-PCS | Mod: S$GLB,,, | Performed by: PHYSICIAN ASSISTANT

## 2023-03-07 NOTE — LETTER
[facility] Occupational Health  [address] [address]  Phone: [phone]  Fax: [phone]  [facility]: [id number]    Pt Name: Prabhakar Moss Jr.  Injury Date: 02/23/2023   Employee ID: [id number] Date of Treatment: 03/07/2023   Company: [employer]      Appointment Time: 09:00 AM Arrived: 9:23 AM    Provider: ANA PAULA Vargas Time Out: 9:57 AM      Office Treatment:   1. Encounter related to worker's compensation claim    2. Laceration of eyebrow and forehead, left, subsequent encounter    3. Head injury without concussion or intracranial hemorrhage, subsequent encounter               Restrictions: Regular Duty, Discharged from Occupational Health     Return As needed. BRENDEN

## 2023-03-07 NOTE — PROGRESS NOTES
Subjective:       Patient ID: Prabhakar Moss Jr. is a 32 y.o. male.    Chief Complaint: Head Injury (Lac above the left eye brow)    RV Head Laceration Left Eyebrow and head (DOI 02/23/23) He works for [employer] as a [employer]. Patient is here to follow up on his head injury. Pain 2/10. But he feels good. He is taking his meds for BP but nothing for pain. MCJ    32-year-old male here for re-evaluation after suture removal from left eyebrow laceration that occurred 2 weeks ago.  Patient doing well, no issues or complaints.  Ready to go back to regular duty and discharged from Occupational Medicine at this time.    Head Injury   Pertinent negatives include no blurred vision, disorientation or numbness.   Laceration     Constitution: Negative. Negative for chills, sweating, fatigue and fever.   HENT: Negative.     Neck: neck negative.   Cardiovascular: Negative.  Negative for chest pain.   Eyes: Negative.  Negative for eye pain, eye redness, photophobia, vision loss, double vision and blurred vision.   Respiratory: Negative.  Negative for chest tightness and shortness of breath.    Gastrointestinal: Negative.    Endocrine: negative.   Genitourinary: Negative.    Musculoskeletal:  Negative for pain, muscle cramps and muscle ache.   Skin:  Positive for laceration. Negative for color change, wound, abrasion, erythema, bruising and hives.   Allergic/Immunologic: Negative.  Negative for environmental allergies, hives, itching and sneezing.   Neurological: Negative.  Negative for dizziness, light-headedness, disorientation, altered mental status, numbness and tingling.   Hematologic/Lymphatic: Negative.    Psychiatric/Behavioral: Negative.  Negative for altered mental status, disorientation, confusion, agitation and nervous/anxious. The patient is not nervous/anxious.       Objective:      Physical Exam  Vitals and nursing note reviewed.   Constitutional:       General: He is awake. He is not in acute  distress.     Appearance: Normal appearance. He is well-developed and normal weight. He is not ill-appearing, toxic-appearing or diaphoretic.   HENT:      Head: Normocephalic and atraumatic.        Right Ear: External ear normal.      Left Ear: External ear normal.      Nose: Nose normal.      Mouth/Throat:      Mouth: Mucous membranes are moist.   Eyes:      Conjunctiva/sclera: Conjunctivae normal.      Pupils: Pupils are equal, round, and reactive to light.   Cardiovascular:      Rate and Rhythm: Normal rate and regular rhythm.      Pulses: Normal pulses.      Heart sounds: Normal heart sounds.   Pulmonary:      Effort: Pulmonary effort is normal.      Breath sounds: Normal breath sounds.   Musculoskeletal:         General: Normal range of motion.      Cervical back: Normal range of motion and neck supple.   Skin:     General: Skin is warm and dry.      Capillary Refill: Capillary refill takes less than 2 seconds.      Findings: Laceration (healed lacerationr over left eyebrow) present. No erythema.   Neurological:      Mental Status: He is alert.   Psychiatric:         Behavior: Behavior is cooperative.       Assessment:       1. Encounter related to worker's compensation claim    2. Laceration of eyebrow and forehead, left, subsequent encounter    3. Head injury without concussion or intracranial hemorrhage, subsequent encounter          Plan:                Restrictions: Regular Duty, Discharged from Occupational Health  Follow up if symptoms worsen or fail to improve.      There are no Patient Instructions on file for this visit.      ANA PAULA Vargas

## 2023-03-13 ENCOUNTER — CLINICAL SUPPORT (OUTPATIENT)
Dept: PRIMARY CARE CLINIC | Facility: CLINIC | Age: 33
End: 2023-03-13
Payer: COMMERCIAL

## 2023-03-13 VITALS — SYSTOLIC BLOOD PRESSURE: 170 MMHG | DIASTOLIC BLOOD PRESSURE: 80 MMHG

## 2023-03-13 DIAGNOSIS — I10 BENIGN ESSENTIAL HTN: ICD-10-CM

## 2023-03-13 PROCEDURE — 99999 PR PBB SHADOW E&M-EST. PATIENT-LVL II: CPT | Mod: PBBFAC,,,

## 2023-03-13 PROCEDURE — 99999 PR PBB SHADOW E&M-EST. PATIENT-LVL II: ICD-10-PCS | Mod: PBBFAC,,,

## 2023-03-13 RX ORDER — AMLODIPINE BESYLATE 10 MG/1
10 TABLET ORAL DAILY
Qty: 90 TABLET | Refills: 3 | Status: SHIPPED | OUTPATIENT
Start: 2023-03-13 | End: 2024-03-12

## 2023-03-13 NOTE — PROGRESS NOTES
Identified pt. By name and   Manually checked bp reading 170/80. Provider notified and patient instructed to start amlodipine 10 mg and to come back in 2 weeks to check bp again

## 2023-03-28 ENCOUNTER — CLINICAL SUPPORT (OUTPATIENT)
Dept: PRIMARY CARE CLINIC | Facility: CLINIC | Age: 33
End: 2023-03-28
Payer: COMMERCIAL

## 2023-03-28 VITALS
RESPIRATION RATE: 18 BRPM | OXYGEN SATURATION: 95 % | DIASTOLIC BLOOD PRESSURE: 58 MMHG | SYSTOLIC BLOOD PRESSURE: 136 MMHG | HEART RATE: 98 BPM

## 2023-03-28 PROCEDURE — 99999 PR PBB SHADOW E&M-EST. PATIENT-LVL II: ICD-10-PCS | Mod: PBBFAC,,,

## 2023-03-28 PROCEDURE — 99999 PR PBB SHADOW E&M-EST. PATIENT-LVL II: CPT | Mod: PBBFAC,,,

## 2023-03-28 NOTE — PROGRESS NOTES
Patient came in on the nurse schedule for a BP check. Patient stated that he took his meds at 5:30am. Patients vitals were checked and his BP reading was 136/58

## 2023-05-30 ENCOUNTER — OFFICE VISIT (OUTPATIENT)
Dept: PRIMARY CARE CLINIC | Facility: CLINIC | Age: 33
End: 2023-05-30
Payer: COMMERCIAL

## 2023-05-30 ENCOUNTER — TELEPHONE (OUTPATIENT)
Dept: OTOLARYNGOLOGY | Facility: CLINIC | Age: 33
End: 2023-05-30
Payer: COMMERCIAL

## 2023-05-30 ENCOUNTER — OFFICE VISIT (OUTPATIENT)
Dept: OTOLARYNGOLOGY | Facility: CLINIC | Age: 33
End: 2023-05-30
Payer: COMMERCIAL

## 2023-05-30 VITALS
HEART RATE: 71 BPM | HEIGHT: 70 IN | TEMPERATURE: 98 F | SYSTOLIC BLOOD PRESSURE: 150 MMHG | BODY MASS INDEX: 45.1 KG/M2 | HEART RATE: 67 BPM | WEIGHT: 315 LBS | SYSTOLIC BLOOD PRESSURE: 154 MMHG | DIASTOLIC BLOOD PRESSURE: 96 MMHG | HEIGHT: 70 IN | RESPIRATION RATE: 16 BRPM | DIASTOLIC BLOOD PRESSURE: 78 MMHG | WEIGHT: 315 LBS | OXYGEN SATURATION: 97 % | BODY MASS INDEX: 45.1 KG/M2

## 2023-05-30 DIAGNOSIS — R05.3 CHRONIC COUGH: ICD-10-CM

## 2023-05-30 DIAGNOSIS — I10 BENIGN ESSENTIAL HTN: Primary | ICD-10-CM

## 2023-05-30 DIAGNOSIS — R05.8 POST-VIRAL COUGH SYNDROME: Primary | ICD-10-CM

## 2023-05-30 DIAGNOSIS — Z87.09 HISTORY OF ASTHMA: ICD-10-CM

## 2023-05-30 DIAGNOSIS — Z23 NEED FOR VACCINATION: ICD-10-CM

## 2023-05-30 PROCEDURE — 90471 IMMUNIZATION ADMIN: CPT | Mod: S$GLB,,, | Performed by: STUDENT IN AN ORGANIZED HEALTH CARE EDUCATION/TRAINING PROGRAM

## 2023-05-30 PROCEDURE — 3008F PR BODY MASS INDEX (BMI) DOCUMENTED: ICD-10-PCS | Mod: CPTII,S$GLB,, | Performed by: STUDENT IN AN ORGANIZED HEALTH CARE EDUCATION/TRAINING PROGRAM

## 2023-05-30 PROCEDURE — 99999 PR PBB SHADOW E&M-EST. PATIENT-LVL V: ICD-10-PCS | Mod: PBBFAC,,, | Performed by: STUDENT IN AN ORGANIZED HEALTH CARE EDUCATION/TRAINING PROGRAM

## 2023-05-30 PROCEDURE — 99999 PR PBB SHADOW E&M-EST. PATIENT-LVL V: CPT | Mod: PBBFAC,,, | Performed by: STUDENT IN AN ORGANIZED HEALTH CARE EDUCATION/TRAINING PROGRAM

## 2023-05-30 PROCEDURE — 3008F PR BODY MASS INDEX (BMI) DOCUMENTED: ICD-10-PCS | Mod: CPTII,S$GLB,, | Performed by: OTOLARYNGOLOGY

## 2023-05-30 PROCEDURE — 1159F PR MEDICATION LIST DOCUMENTED IN MEDICAL RECORD: ICD-10-PCS | Mod: CPTII,S$GLB,, | Performed by: OTOLARYNGOLOGY

## 2023-05-30 PROCEDURE — 3077F SYST BP >= 140 MM HG: CPT | Mod: CPTII,S$GLB,, | Performed by: STUDENT IN AN ORGANIZED HEALTH CARE EDUCATION/TRAINING PROGRAM

## 2023-05-30 PROCEDURE — 3077F SYST BP >= 140 MM HG: CPT | Mod: CPTII,S$GLB,, | Performed by: OTOLARYNGOLOGY

## 2023-05-30 PROCEDURE — 1159F PR MEDICATION LIST DOCUMENTED IN MEDICAL RECORD: ICD-10-PCS | Mod: CPTII,S$GLB,, | Performed by: STUDENT IN AN ORGANIZED HEALTH CARE EDUCATION/TRAINING PROGRAM

## 2023-05-30 PROCEDURE — 3080F DIAST BP >= 90 MM HG: CPT | Mod: CPTII,S$GLB,, | Performed by: OTOLARYNGOLOGY

## 2023-05-30 PROCEDURE — 1159F MED LIST DOCD IN RCRD: CPT | Mod: CPTII,S$GLB,, | Performed by: STUDENT IN AN ORGANIZED HEALTH CARE EDUCATION/TRAINING PROGRAM

## 2023-05-30 PROCEDURE — 1160F PR REVIEW ALL MEDS BY PRESCRIBER/CLIN PHARMACIST DOCUMENTED: ICD-10-PCS | Mod: CPTII,S$GLB,, | Performed by: OTOLARYNGOLOGY

## 2023-05-30 PROCEDURE — 3080F PR MOST RECENT DIASTOLIC BLOOD PRESSURE >= 90 MM HG: ICD-10-PCS | Mod: CPTII,S$GLB,, | Performed by: OTOLARYNGOLOGY

## 2023-05-30 PROCEDURE — 99204 OFFICE O/P NEW MOD 45 MIN: CPT | Mod: 25,S$GLB,, | Performed by: OTOLARYNGOLOGY

## 2023-05-30 PROCEDURE — 99999 PR PBB SHADOW E&M-EST. PATIENT-LVL IV: CPT | Mod: PBBFAC,,, | Performed by: OTOLARYNGOLOGY

## 2023-05-30 PROCEDURE — 3008F BODY MASS INDEX DOCD: CPT | Mod: CPTII,S$GLB,, | Performed by: OTOLARYNGOLOGY

## 2023-05-30 PROCEDURE — 3078F DIAST BP <80 MM HG: CPT | Mod: CPTII,S$GLB,, | Performed by: STUDENT IN AN ORGANIZED HEALTH CARE EDUCATION/TRAINING PROGRAM

## 2023-05-30 PROCEDURE — 31575 DIAGNOSTIC LARYNGOSCOPY: CPT | Mod: S$GLB,,, | Performed by: OTOLARYNGOLOGY

## 2023-05-30 PROCEDURE — 31575 LARYNGOSCOPY: ICD-10-PCS | Mod: S$GLB,,, | Performed by: OTOLARYNGOLOGY

## 2023-05-30 PROCEDURE — 99214 OFFICE O/P EST MOD 30 MIN: CPT | Mod: 25,S$GLB,, | Performed by: STUDENT IN AN ORGANIZED HEALTH CARE EDUCATION/TRAINING PROGRAM

## 2023-05-30 PROCEDURE — 3078F PR MOST RECENT DIASTOLIC BLOOD PRESSURE < 80 MM HG: ICD-10-PCS | Mod: CPTII,S$GLB,, | Performed by: STUDENT IN AN ORGANIZED HEALTH CARE EDUCATION/TRAINING PROGRAM

## 2023-05-30 PROCEDURE — 99999 PR PBB SHADOW E&M-EST. PATIENT-LVL IV: ICD-10-PCS | Mod: PBBFAC,,, | Performed by: OTOLARYNGOLOGY

## 2023-05-30 PROCEDURE — 90715 TDAP VACCINE 7 YRS/> IM: CPT | Mod: S$GLB,,, | Performed by: STUDENT IN AN ORGANIZED HEALTH CARE EDUCATION/TRAINING PROGRAM

## 2023-05-30 PROCEDURE — 90715 TDAP VACCINE GREATER THAN OR EQUAL TO 7YO IM: ICD-10-PCS | Mod: S$GLB,,, | Performed by: STUDENT IN AN ORGANIZED HEALTH CARE EDUCATION/TRAINING PROGRAM

## 2023-05-30 PROCEDURE — 99214 PR OFFICE/OUTPT VISIT, EST, LEVL IV, 30-39 MIN: ICD-10-PCS | Mod: 25,S$GLB,, | Performed by: STUDENT IN AN ORGANIZED HEALTH CARE EDUCATION/TRAINING PROGRAM

## 2023-05-30 PROCEDURE — 1159F MED LIST DOCD IN RCRD: CPT | Mod: CPTII,S$GLB,, | Performed by: OTOLARYNGOLOGY

## 2023-05-30 PROCEDURE — 1160F RVW MEDS BY RX/DR IN RCRD: CPT | Mod: CPTII,S$GLB,, | Performed by: OTOLARYNGOLOGY

## 2023-05-30 PROCEDURE — 3008F BODY MASS INDEX DOCD: CPT | Mod: CPTII,S$GLB,, | Performed by: STUDENT IN AN ORGANIZED HEALTH CARE EDUCATION/TRAINING PROGRAM

## 2023-05-30 PROCEDURE — 99204 PR OFFICE/OUTPT VISIT, NEW, LEVL IV, 45-59 MIN: ICD-10-PCS | Mod: 25,S$GLB,, | Performed by: OTOLARYNGOLOGY

## 2023-05-30 PROCEDURE — 90471 TDAP VACCINE GREATER THAN OR EQUAL TO 7YO IM: ICD-10-PCS | Mod: S$GLB,,, | Performed by: STUDENT IN AN ORGANIZED HEALTH CARE EDUCATION/TRAINING PROGRAM

## 2023-05-30 PROCEDURE — 3077F PR MOST RECENT SYSTOLIC BLOOD PRESSURE >= 140 MM HG: ICD-10-PCS | Mod: CPTII,S$GLB,, | Performed by: OTOLARYNGOLOGY

## 2023-05-30 PROCEDURE — 3077F PR MOST RECENT SYSTOLIC BLOOD PRESSURE >= 140 MM HG: ICD-10-PCS | Mod: CPTII,S$GLB,, | Performed by: STUDENT IN AN ORGANIZED HEALTH CARE EDUCATION/TRAINING PROGRAM

## 2023-05-30 RX ORDER — PANTOPRAZOLE SODIUM 40 MG/1
40 TABLET, DELAYED RELEASE ORAL DAILY
Qty: 30 TABLET | Refills: 2 | Status: SHIPPED | OUTPATIENT
Start: 2023-05-30 | End: 2023-08-25 | Stop reason: SDUPTHER

## 2023-05-30 NOTE — PROGRESS NOTES
"Subjective:       Patient ID: Prabhakar Moss Jr. is a 32 y.o. male.    Chief Complaint: Follow-up    HPI:  32 y.o. male presents to [facility] here for 3 month follow-up visit    Acute concerns?: None today. Hasn't taken amlodipine yet and will take once home.    HTN:  Home BP log?: Doesn't recall any systolic values above 140. No known values over 90 diastolic  Medications: amlodipine 10 mg  Compliance?: Misses rarely (2 times with night shift since last seen). Hasn't taken since last seen      Diet?: Trying to get back to body building diet  Exercise?: Has been power lifting    Cough has been present past 2 months. Feels may be related to work chemicals. Failed antibiotic and nasal steroid for seasonal allergies. Non-productive.      Review of Systems   Constitutional:  Negative for chills, diaphoresis, fatigue and fever.   Respiratory:  Positive for cough (Has been present for about 2 months. Had trial of antibiotics in past and nasal steroids. Inhaler and cough medication at house helps ease.). Negative for chest tightness and shortness of breath.    Cardiovascular:  Negative for chest pain and palpitations.   Gastrointestinal:  Negative for abdominal pain, diarrhea, nausea and vomiting.   Musculoskeletal:  Negative for arthralgias, joint swelling and myalgias.   Skin:  Negative for rash and wound.   Neurological:  Negative for dizziness, light-headedness and headaches.     Objective:      Vitals:    05/30/23 0811   BP: (!) 150/78   BP Location: Left arm   Patient Position: Sitting   BP Method: Large (Manual)   Pulse: 71   Resp: 16   Temp: 97.9 °F (36.6 °C)   TempSrc: Temporal   SpO2: 97%   Weight: (!) 183.8 kg (405 lb 5.1 oz)   Height: 5' 10" (1.778 m)     Physical Exam  Vitals reviewed.   Constitutional:       General: He is not in acute distress.     Appearance: Normal appearance. He is not ill-appearing.   HENT:      Head: Normocephalic and atraumatic.   Eyes:      General:         Right eye: No " discharge.         Left eye: No discharge.      Conjunctiva/sclera: Conjunctivae normal.   Cardiovascular:      Rate and Rhythm: Normal rate.   Pulmonary:      Effort: Pulmonary effort is normal.   Musculoskeletal:         General: No deformity.   Skin:     Coloration: Skin is not jaundiced or pale.   Neurological:      General: No focal deficit present.      Mental Status: He is alert and oriented to person, place, and time.   Psychiatric:         Mood and Affect: Mood normal.         Behavior: Behavior normal.           Lab Results   Component Value Date     02/02/2023    K 4.1 02/02/2023     02/02/2023    CO2 24 02/02/2023    BUN 18 02/02/2023    CREATININE 1.0 02/02/2023    ANIONGAP 11 02/02/2023     No results found for: HGBA1C  No results found for: BNP, BNPTRIAGEBLO    Lab Results   Component Value Date    WBC 8.08 02/02/2023    HGB 14.1 02/02/2023    HCT 43.8 02/02/2023     02/02/2023    GRAN 4.8 02/02/2023    GRAN 59.9 02/02/2023     Lab Results   Component Value Date    CHOL 198 02/02/2023    HDL 35 (L) 02/02/2023    LDLCALC 133.0 02/02/2023    TRIG 150 02/02/2023          Current Outpatient Medications:     amLODIPine (NORVASC) 10 MG tablet, Take 1 tablet (10 mg total) by mouth once daily., Disp: 90 tablet, Rfl: 3    pantoprazole (PROTONIX) 40 MG tablet, Take 1 tablet (40 mg total) by mouth once daily., Disp: 30 tablet, Rfl: 2        Assessment:       1. Benign essential HTN    2. Chronic cough    3. Need for vaccination           Plan:       Benign essential HTN  Chronic cough  -     Ambulatory referral/consult to ENT; Future; Expected date: 06/06/2023  -     pantoprazole (PROTONIX) 40 MG tablet; Take 1 tablet (40 mg total) by mouth once daily.  Dispense: 30 tablet; Refill: 2  - If BP remains elevated, will add ace inhibitor. Informed of risk of oral swelling if needed and need to present to ED if this occurs    Need for vaccination  -     (In Office Administered) Tdap Vaccine    RTC  in 3 months

## 2023-05-30 NOTE — PROCEDURES
"Laryngoscopy    Date/Time: 5/30/2023 11:20 AM  Performed by: Gio Gonzalez MD  Authorized by: Gio Gonzalez MD     Time out: Immediately prior to procedure a "time out" was called to verify the correct patient, procedure, equipment, support staff and site/side marked as required.    Consent Done?:  Yes (Verbal)  Anesthesia:     Local anesthetic:  Other    Patient tolerance:  Patient tolerated the procedure well with no immediate complications    Decongestion performed?: Yes    Laryngoscopy:     Areas examined:  Nasal cavities, nasopharynx, oropharynx, hypopharynx, larynx and vocal cords  Larynx/hypopharynx:      Transnasal aerosolized 4% lidocaine and 0.5% oxymetazoline delivered as well as transoral lidocaine.  The distal chip at tip flexible video laryngoscope from [employer] was used to examine both nares with no findings of active inflammatory sinus disease such as edema, polyp or active drainage.  There is mild cobblestoning and a minimal degree of vocal cord and posterior laryngeal edema without significant erythema.  No tremor or asymmetric movement of the vocal cords.  No pooling.  Specifically no primary or secondary vocal lesions such as hemorrhage, polyp, cyst, nodule or leukoplakia.  "

## 2023-05-30 NOTE — PROGRESS NOTES
"  [facility]    Subjective:      Patient: Prabhakar Moss Jr. Patient PCP: Khari Vargas MD         :  1990     Sex:  male      MRN:  [id number]          Date of Visit: 2023      Chief Complaint: Cough (States that has had a dry cough for about the past 2 months. States did have a cold prior to the cough starting. 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 States gets "coughing fits" at times-the last a couple of minutes at a time. Can have multiple in a day, and can go days without. States has taken inhalers, steroids, cough meds and antibiotics. States the cough med helps for a couple of hours.PCP just saw pt and gave Protonix.  RSI 7/45, GERD 0/5)      Patient ID: Prabhakar Moss Jr. is a 32 y.o. male former occasional smoker with childhood asthma and newly diagnosed HTN on CCB on Protonix recently for cough with no symptoms of GERD with RSI of 7 0/5 for reflux referred to me by Dr. Khari Vargas in consultation for 2 months of cough.  Improved with inhalers, antibiotics and steroids but still with paroxysmal cough.  Feels a "little itch" in the center of the chest.  No rhinitis, PND or sinus pressure or pain.  Started with a URI which resolved other than the cough.  No hemoptysis, sore throat, \voice change or dysphagia.  No TB exposures.  Some chemical / fumes in  work.  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    Cough started after a viral illness.  All symptoms resolved.  No symptoms of sinusitis (no facial pressure, loss of smell, nasal obstruction, cloudy drainage or subjective postnasal drip) or active symptoms of reflux.    Review of Systems     Past Medical History  He has a past medical history of Hypertension.    Family / Surgical / Social History  His family history includes Hypertension in his brother and father.    Past Surgical History:   Procedure Laterality Date    KNEE SURGERY Left "     SHOULDER SURGERY Left        Social History     Tobacco Use    Smoking status: Former     Types: Cigars     Passive exposure: Never    Smokeless tobacco: Never   Substance and Sexual Activity    Alcohol use: Yes     Comment: rarely    Drug use: Never    Sexual activity: Not on file       Medications  He has a current medication list which includes the following prescription(s): amlodipine and pantoprazole.      Allergies  Review of patient's allergies indicates:  No Known Allergies    All medications, allergies, and past history have been reviewed.    Objective:      Vitals:  Vitals - 1 value per visit 5/30/2023 5/30/2023 5/30/2023   SYSTOLIC - 150 154   DIASTOLIC - 78 96   Pulse - 71 67   Temp - 97.9 -   Resp - 16 -   SPO2 - 97 -   Weight (lb) - 405.32 405.21   Weight (kg) - 183.85 183.8   Height - 70 70   BMI (Calculated) - 58.2 58.1   VISIT REPORT - - -   Pain Score  0 - -       Body surface area is 3.01 meters squared.    Physical Exam:    GENERAL  APPEARANCE -  alert, appears stated age, cooperative, and morbidly obese  BARRIER(S) TO COMMUNICATION -  none VOICE - appropriate for age and gender    INTEGUMENTARY  no suspicious head and neck lesions    HEENT  HEAD: Normocephalic, without obvious abnormality, atraumatic  FACE: INSPECTION - Symmetric, no signs of trauma, no suspicious lesion(s)  PALPATION -  No masses SALIVARY GLANDS - non-tender with no appreciable mass  STRENGTH - facial symmetry  NECK/THYROID: normal atraumatic, no neck masses, normal thyroid, no jvd    EYES  Normal occular alignment and mobility with no visible nystagmus at rest    EARS/NOSE/MOUTH/THROAT  EARS  PINNAE AND EXTERNAL EARS - no suspicious lesion OTOSCOPIC EXAM (surgical microscopy was not used for visualization/instrumentation): EAR EXAM - Normal ear canals, tympanic membranes and mobility, and middle ear spaces bilaterally.  HEARING - grossly intact to voice/finger rub    NOSE AND SINUSES  EXTERNAL NOSE - Grossly normal for  age/sex  SEPTUM - normal/no obstruction on anterior exam without decongestion TURBINATES - within normal limits MUCOSA - within normal limits     MOUTH AND THROAT   ORAL CAVITY, LIPS, TEETH, GUMS & TONGUE - moist, no suspicious lesions  OROPHARYNX /TONSILS/PHARYNGEAL WALLS/HYPOPHARYNX - no erythema or exudates  NASOPHARYNX - limited mirror exam - unable to visualize due to anatomy/gag  LARYNX -  - limited mirror exam - unable to visualize due to anatomy/gag      CHEST AND LUNG   INSPECTION & AUSCULTATION - normal effort, no stridor    CARDIOVASCULAR  AUSCULTATION & PERIPHERAL VASCULAR - regular rate and rhythm.    NEUROLOGIC  MENTAL STATUS - alert, interactive CRANIAL NERVES - normal    LYMPHATIC  HEAD AND NECK - non-palpable      Procedure(s):  Flexible laryngoscopy performed.  See procedure note.    Labs:  WBC   Date Value Ref Range Status   02/02/2023 8.08 3.90 - 12.70 K/uL Final     Hemoglobin   Date Value Ref Range Status   02/02/2023 14.1 14.0 - 18.0 g/dL Final     Platelets   Date Value Ref Range Status   02/02/2023 281 150 - 450 K/uL Final     Creatinine   Date Value Ref Range Status   02/02/2023 1.0 0.5 - 1.4 mg/dL Final     TSH   Date Value Ref Range Status   02/02/2023 3.592 0.400 - 4.000 uIU/mL Final     Glucose   Date Value Ref Range Status   02/02/2023 98 70 - 110 mg/dL Final         Assessment:      Problem List Items Addressed This Visit    None  Visit Diagnoses       Post-viral cough syndrome    -  Primary    Chronic cough        History of asthma                     Plan:        As discussed likely cause of the protracted cough is some reactive airway disease exacerbation (childhood history of asthma) from viral illness.  A chest x-ray and inhaled steroids are recommended.  Patient encouraged to rinse the mouth gargle and spit after using the inhaled steroids.  The previously prescribed albuterol can be used for as needed reduction and tightness of the chest and cough.  The steroid should be used  regularly until cough is controlled.  Can be restarted as needed for any future exacerbations of cough or shortness of breath.      We discussed other diagnoses including the possibility of cough neuralgia post viral infection, but with a history of reactive airway disease and the feeling of a tickle in the chest, post viral cough is more likely.

## 2023-05-30 NOTE — PATIENT INSTRUCTIONS
As discussed likely cause of the protracted cough is some reactive airway disease exacerbation (childhood history of asthma) from viral illness.  A chest x-ray and inhaled steroids are recommended.  Patient encouraged to rinse the mouth gargle and spit after using the inhaled steroids.  The previously prescribed albuterol can be used for as needed reduction and tightness of the chest and cough.  The steroid should be used regularly until cough is controlled.  Can be restarted as needed for any future exacerbations of cough or shortness of breath.      We discussed other diagnoses including the possibility of cough neuralgia post viral infection, but with a history of reactive airway disease and the feeling of a tickle in the chest, post viral cough is more likely.    Return with any worsening of symptoms, failure to improve, or any other concerns for further evaluation and treatment.        THROAT CLEARING     Why am I clearing my throat so often?   Irritation of the vocal folds and surrounding area can cause the urge to clear your throat. This irritation can be caused by acid reflux, allergies, or environmental factors, as well as from a cold or sore throat. Talk with your doctor about the treatment of possible underlying causes. However, throat clearing can turn into a cycle. You clear your throat after feeling an irritation, which causes more irritation, which causes you to continue clearing your throat, which causes more irritation.     What can I do about it?   Ask a friend or family member to help you keep track - you may not even realize how often you do it.   Replace the throat clearing with a different behavior.   Take a sip of water and then swallow. Repeat until the sensation subsides.  Swallow hard (even without water)   Use the silent throat clear method by making the h sound when you exhale  Breathe in deeply through your nose and exhale on shhh   Hum quietly   Keep yourself hydrated.   Drink  plenty of water   Eat wet snacks (grapes, apples, melon, cucumber)   Use a humidifier at home or at work   Suck on hard candies, but avoid too much sugar and avoid anything with mint, menthol, camphor, or eucaplyptus, as these will have a more irritating effect.        ACID REFLUX   What is acid reflux?    When we eat, food passes from the throat and into the stomach through a tube called the esophagus. At the bottom of the esophagus is a ring of muscles that acts as a valve between the esophagus and stomach, called the lower esophageal sphincter. Smoking, alcohol, and certain types of food may weaken the sphincter, so it may stop closing properly. The contents in the stomach then may leak back, or reflux, into the esophagus. This problem is called gastroesophageal reflux disease (GERD). Symptoms of GERD include heartburn, belching, regurgitation of stomach contents, and swallowing difficulties.    Sometimes, the stomach acid travels up through the esophagus and spills into the larynx or pharynx (voice box). This is called laryngopharyngeal reflux (LPR) and is irritating to the vocal folds and surrounding tissues. Often, patients with LPR do not experience heartburn as a symptom. More commonly, symptoms of LPR include hoarseness, excessive mucous resulting in frequent throat clearing, post-nasal drip, coughing, throat soreness or burning, choking episodes, difficulty swallowing, and sensation of a lump in the throat.     How is acid reflux treated?   Treatment for acid reflux can involve any combination of medication, lifestyle modifications, and surgery.   Medications. Your doctor may prescribe a proton pump inhibitor (PPI) or an H2 blocker. If you are prescribed a PPI, take in on an empty stomach in the morning 30 minutes prior to eating breakfast. Keep in mind that it may take 4-6 weeks before symptoms begin to resolve, so do not stop medications without consulting your doctor.   Lifestyle and dietary  modifications. Eat smaller meals at a slower pace. Avoid over-eating. If you are overweight, try to lose weight. Do not lie down or exercise directly after eating; eat your last meal of the day at least 2-3 hours prior to going to sleep. Avoid tight-fitting clothes. If you are a smoker, reduce or quit smoking. Elevate your head of bed 4-6 inches by putting phone books under the legs at the head of your bed or buy a wedge pillow, but do not use more than two regular pillows as this causes the body to curl and compresses your stomach.     Food group Foods to avoid to reduce reflux   Beverages  Whole milk, 2% milk, chocolate milk/hot chocolate, alcohol, coffee (regular and decaf), caffeinated tea, mint tea, carbonated beverages, citrus juice    Breads/grains Commercial sweet rolls, doughnuts, croissants, and other high-fat pastries    Fruits and vegetables Fried or cream-style vegetables, tomatoes, tomato-based products, citrus fruits, hot peppers    Soups and seasonings Cream, cheese, tomato-based soups, vinegar    Meats and proteins Fatty or fried meat/fish, vazquez, sausage, pepperoni, lunch meat, fried eggs    Fats and oil Lard, vazquez drippings, salt pork, meat drippings, gravies, highly seasoned salad dressings, nuts    Sweets/desserts Anything made with or from chocolate, peppermint, spearmint, whole milk, or cream; high-fat pastries, gum, hard candy

## 2023-05-30 NOTE — LETTER
May 30, 2023      [facility] [id number], [facility] [address]  [facility] [address]  Phone: [phone]  Fax: [phone]       Patient: Prabhakar Moss   YOB: 1990  Date of Visit: 05/30/2023    To Whom It May Concern:    Goyo Moss  was at [facility] on 05/30/2023. The patient may return to work/school on 05/ with no restrictions. If you have any questions or concerns, or if I can be of further assistance, please do not hesitate to contact me.    Sincerely,    Nakia Lane MA

## 2023-05-30 NOTE — TELEPHONE ENCOUNTER
Left message on voicemail for pt that he can check in now for his 11 am appt with Dr. Gonzalez that was just added to the schedule by his PCP. Pt had appt with PCP at 8 am. Thanks, Lisa

## 2023-06-05 ENCOUNTER — PATIENT MESSAGE (OUTPATIENT)
Dept: PRIMARY CARE CLINIC | Facility: CLINIC | Age: 33
End: 2023-06-05
Payer: COMMERCIAL

## 2023-06-05 DIAGNOSIS — R74.01 TRANSAMINITIS: Primary | ICD-10-CM

## 2023-06-15 ENCOUNTER — CLINICAL SUPPORT (OUTPATIENT)
Dept: PRIMARY CARE CLINIC | Facility: CLINIC | Age: 33
End: 2023-06-15
Payer: COMMERCIAL

## 2023-06-15 VITALS — DIASTOLIC BLOOD PRESSURE: 80 MMHG | SYSTOLIC BLOOD PRESSURE: 144 MMHG

## 2023-06-15 DIAGNOSIS — I10 BENIGN ESSENTIAL HTN: Primary | ICD-10-CM

## 2023-06-15 PROCEDURE — 99999 PR PBB SHADOW E&M-EST. PATIENT-LVL II: CPT | Mod: PBBFAC,,,

## 2023-06-15 PROCEDURE — 99999 PR PBB SHADOW E&M-EST. PATIENT-LVL II: ICD-10-PCS | Mod: PBBFAC,,,

## 2023-06-15 RX ORDER — BENAZEPRIL HYDROCHLORIDE 10 MG/1
10 TABLET ORAL DAILY
Qty: 90 TABLET | Refills: 3 | Status: SHIPPED | OUTPATIENT
Start: 2023-06-15 | End: 2024-01-29 | Stop reason: SDUPTHER

## 2023-06-15 NOTE — PROGRESS NOTES
Patient was seen today on the Nurse visit schedule. Patient stated that he did take his blood pressure medication around 7:30 am this morning. I CLAYTON Camacho asked patient to sit for 5 minutes. Patient sat for 5 minutes I checked his blood pressure, the range was 152/104. I informed the patient to sit an additional 5 minutes. I rechecked blood pressure his blood pressure. Patient blood pressure was 144/80. I explained to the patient that his blood pressure was elevate; I would have to speak with his provider about elevated Bp. I Spoke with  in regards to patient blood pressure.  prescribe patient an additional medication which was benazepril 10 mg. Patient verbalized that medication was sent to his local pharmacy.

## 2023-08-14 ENCOUNTER — OCCUPATIONAL HEALTH (OUTPATIENT)
Dept: URGENT CARE | Facility: CLINIC | Age: 33
End: 2023-08-14

## 2023-08-14 DIAGNOSIS — Z02.89 ENCOUNTER FOR EXAMINATION REQUIRED BY DEPARTMENT OF TRANSPORTATION (DOT): Primary | ICD-10-CM

## 2023-08-14 PROCEDURE — 99499 PHYSICAL, RECERT DOT/CDL: ICD-10-PCS | Mod: S$GLB,,, | Performed by: PHYSICIAN ASSISTANT

## 2023-08-14 PROCEDURE — 99499 UNLISTED E&M SERVICE: CPT | Mod: S$GLB,,, | Performed by: PHYSICIAN ASSISTANT

## 2023-08-25 ENCOUNTER — OFFICE VISIT (OUTPATIENT)
Dept: PRIMARY CARE CLINIC | Facility: CLINIC | Age: 33
End: 2023-08-25
Payer: COMMERCIAL

## 2023-08-25 VITALS
BODY MASS INDEX: 45.1 KG/M2 | TEMPERATURE: 92 F | SYSTOLIC BLOOD PRESSURE: 150 MMHG | OXYGEN SATURATION: 99 % | RESPIRATION RATE: 16 BRPM | WEIGHT: 315 LBS | HEART RATE: 88 BPM | HEIGHT: 70 IN | DIASTOLIC BLOOD PRESSURE: 82 MMHG

## 2023-08-25 DIAGNOSIS — L30.9 FOOT DERMATITIS: ICD-10-CM

## 2023-08-25 DIAGNOSIS — M25.531 RIGHT WRIST PAIN: Primary | ICD-10-CM

## 2023-08-25 DIAGNOSIS — R05.3 CHRONIC COUGH: ICD-10-CM

## 2023-08-25 DIAGNOSIS — M72.2 PLANTAR FASCIITIS OF LEFT FOOT: ICD-10-CM

## 2023-08-25 PROCEDURE — 99999 PR PBB SHADOW E&M-EST. PATIENT-LVL V: ICD-10-PCS | Mod: PBBFAC,,, | Performed by: STUDENT IN AN ORGANIZED HEALTH CARE EDUCATION/TRAINING PROGRAM

## 2023-08-25 PROCEDURE — 3079F PR MOST RECENT DIASTOLIC BLOOD PRESSURE 80-89 MM HG: ICD-10-PCS | Mod: CPTII,S$GLB,, | Performed by: STUDENT IN AN ORGANIZED HEALTH CARE EDUCATION/TRAINING PROGRAM

## 2023-08-25 PROCEDURE — 1160F PR REVIEW ALL MEDS BY PRESCRIBER/CLIN PHARMACIST DOCUMENTED: ICD-10-PCS | Mod: CPTII,S$GLB,, | Performed by: STUDENT IN AN ORGANIZED HEALTH CARE EDUCATION/TRAINING PROGRAM

## 2023-08-25 PROCEDURE — 4010F ACE/ARB THERAPY RXD/TAKEN: CPT | Mod: CPTII,S$GLB,, | Performed by: STUDENT IN AN ORGANIZED HEALTH CARE EDUCATION/TRAINING PROGRAM

## 2023-08-25 PROCEDURE — 3077F SYST BP >= 140 MM HG: CPT | Mod: CPTII,S$GLB,, | Performed by: STUDENT IN AN ORGANIZED HEALTH CARE EDUCATION/TRAINING PROGRAM

## 2023-08-25 PROCEDURE — 1159F MED LIST DOCD IN RCRD: CPT | Mod: CPTII,S$GLB,, | Performed by: STUDENT IN AN ORGANIZED HEALTH CARE EDUCATION/TRAINING PROGRAM

## 2023-08-25 PROCEDURE — 1160F RVW MEDS BY RX/DR IN RCRD: CPT | Mod: CPTII,S$GLB,, | Performed by: STUDENT IN AN ORGANIZED HEALTH CARE EDUCATION/TRAINING PROGRAM

## 2023-08-25 PROCEDURE — 99214 OFFICE O/P EST MOD 30 MIN: CPT | Mod: S$GLB,,, | Performed by: STUDENT IN AN ORGANIZED HEALTH CARE EDUCATION/TRAINING PROGRAM

## 2023-08-25 PROCEDURE — 99999 PR PBB SHADOW E&M-EST. PATIENT-LVL V: CPT | Mod: PBBFAC,,, | Performed by: STUDENT IN AN ORGANIZED HEALTH CARE EDUCATION/TRAINING PROGRAM

## 2023-08-25 PROCEDURE — 99214 PR OFFICE/OUTPT VISIT, EST, LEVL IV, 30-39 MIN: ICD-10-PCS | Mod: S$GLB,,, | Performed by: STUDENT IN AN ORGANIZED HEALTH CARE EDUCATION/TRAINING PROGRAM

## 2023-08-25 PROCEDURE — 1159F PR MEDICATION LIST DOCUMENTED IN MEDICAL RECORD: ICD-10-PCS | Mod: CPTII,S$GLB,, | Performed by: STUDENT IN AN ORGANIZED HEALTH CARE EDUCATION/TRAINING PROGRAM

## 2023-08-25 PROCEDURE — 3077F PR MOST RECENT SYSTOLIC BLOOD PRESSURE >= 140 MM HG: ICD-10-PCS | Mod: CPTII,S$GLB,, | Performed by: STUDENT IN AN ORGANIZED HEALTH CARE EDUCATION/TRAINING PROGRAM

## 2023-08-25 PROCEDURE — 3079F DIAST BP 80-89 MM HG: CPT | Mod: CPTII,S$GLB,, | Performed by: STUDENT IN AN ORGANIZED HEALTH CARE EDUCATION/TRAINING PROGRAM

## 2023-08-25 PROCEDURE — 3008F BODY MASS INDEX DOCD: CPT | Mod: CPTII,S$GLB,, | Performed by: STUDENT IN AN ORGANIZED HEALTH CARE EDUCATION/TRAINING PROGRAM

## 2023-08-25 PROCEDURE — 4010F PR ACE/ARB THEARPY RXD/TAKEN: ICD-10-PCS | Mod: CPTII,S$GLB,, | Performed by: STUDENT IN AN ORGANIZED HEALTH CARE EDUCATION/TRAINING PROGRAM

## 2023-08-25 PROCEDURE — 3008F PR BODY MASS INDEX (BMI) DOCUMENTED: ICD-10-PCS | Mod: CPTII,S$GLB,, | Performed by: STUDENT IN AN ORGANIZED HEALTH CARE EDUCATION/TRAINING PROGRAM

## 2023-08-25 RX ORDER — PANTOPRAZOLE SODIUM 20 MG/1
20 TABLET, DELAYED RELEASE ORAL DAILY
Qty: 90 TABLET | Refills: 3 | Status: SHIPPED | OUTPATIENT
Start: 2023-08-25 | End: 2024-01-29

## 2023-08-25 RX ORDER — CLOTRIMAZOLE 1 %
CREAM (GRAM) TOPICAL 2 TIMES DAILY
Qty: 60 G | Refills: 0 | Status: SHIPPED | OUTPATIENT
Start: 2023-08-25 | End: 2023-11-16 | Stop reason: SDUPTHER

## 2023-08-25 RX ORDER — MELOXICAM 7.5 MG/1
7.5 TABLET ORAL DAILY
Qty: 30 TABLET | Refills: 1 | Status: SHIPPED | OUTPATIENT
Start: 2023-08-25 | End: 2024-01-29

## 2023-08-25 RX ORDER — PROMETHAZINE HYDROCHLORIDE AND DEXTROMETHORPHAN HYDROBROMIDE 6.25; 15 MG/5ML; MG/5ML
10 SYRUP ORAL EVERY 6 HOURS PRN
COMMUNITY
Start: 2023-04-21 | End: 2024-01-29

## 2023-08-25 NOTE — PROGRESS NOTES
"INPATIENT PSYCHIATRIC PROGRESS NOTE    Name:  Dawna Jolly  :  1957  MRN:  [id number]  Visit Number:  [id number]  Length of stay:  2    Behavioral Health Treatment Plan and Problem List: I have reviewed and approved the Behavioral Health Treatment Plan and Problem list.    SUBJECTIVE    CC/ Focus of exam: Depression, Somatization     Patient's subjective status: \"I am not depressed I just want to get better, I do not want to die\"    INTERVAL HISTORY: Patient initially states that she could not walk to the interview room because of her fibromyalgia but subsequently did.  She states she cannot eat or drink but did take her oral medications with at least a small amount of water.  Nurses will be doing I&O per shift.     Patient continues to be very evasive regarding the sequence of events that led to her hospitalization, in essence it is most likely that people were not responding to her somatic complaints and distress to the extent she expected and thus her communication for them to come and watch her die that the family and friends thought she meant to kill herself.  Then she did take the overdose of gabapentin that apparently necessitated resuscitation.    Patient insistent that her gastroparesis is severe and will necessitate intravenous feeding and that her  has agreed to take her directly to the [facility] to consult with Dr. Brand who will address her gastroparesis.  Patient reviewed a history of multiple gastroenterologist over many years being involved with the care of her Crohn's disease and at times she drifts into saying that she plans to have a colonectomy with a colostomy because of the ulcerative colitis and also she is going to see somebody about a hematoma on her right hip in [place].     Patient is on a 72-hour hold at least until we can clarify a safe disposition plan.  At this time I do not believe that she is at serious risk to harm herself if she can gain satisfying " Subjective:       Patient ID: Prabhakar Moss Jr. is a 32 y.o. male.    Chief Complaint: Pain (Pt states that he has been having pain in left foot for 2 or 3 months now/Pt wants a referral ) and Wrist Pain (Pt states that he has been having pain on right wrist when he has been working out /Pain often comes and goes )    HPI:  32 y.o. male presents to [facility] with complaints of left foot and right wrist pains    Patient reports pain began in left foot that began a couple months ago. Pain is in outside of foot just distal to his heel/ankle. Feels may be realted to steel toe boots with work. Hasn't done anything for pain to date. Pain is described as worst with first few steps in the morning. Does climb ladders a lot at work      Onset?: ~ 2 months ago  Progression?: Has lessened some since onset. Had a lot of difficulty getting out of bed early on.  Inciting incident/new physical activity?: No  Past trauma/surgery?: Possible fracture of foot near toes with foor. Uncertain if it was left or right foot.  Recurrent?: No  Description?: Throbbing and sharp  Radiates?: No  Severity?: 9/10 early on, now 5/10  Worsening factors?: Weight bearing  Interventions?: Nothing  Did interventions help?: N/A        Patient reports right wrist pain that began about 2 months ago. Was benching heavy weight and allowed wrist to extend too    Onset?: 2 months ago  Progression?: Improved since onset, practically resolved at this point  Inciting incident/new physical activity?: Heavy bench press  Past trauma/surgery?: [place] pain acutely with bench press  Recurrent?: No  Description?: Felt unstable and tender  Radiates?: Posterior wrist  Severity?: 5/10 at worst, negligible now  Worsening factors?: Has been improving  Interventions?: None  Did interventions help?: N/A      History of bariatric surgery, MI, renal disease, or GI bleed/ulcer?: No    Review of Systems   Constitutional:  Negative for chills, diaphoresis, fatigue and fever.  "  HENT:  Negative for congestion, sinus pressure, sneezing and sore throat.    Respiratory:  Positive for cough (recent after running out of pantoprazole). Negative for shortness of breath.    Cardiovascular:  Negative for chest pain and palpitations.   Gastrointestinal:  Negative for abdominal pain, diarrhea, nausea and vomiting.   Musculoskeletal:  Positive for arthralgias. Negative for joint swelling and myalgias.   Skin:  Negative for rash and wound.   Neurological:  Negative for weakness and numbness.       Objective:      Vitals:    08/25/23 1301   BP: (!) 142/86   BP Location: Left arm   Patient Position: Sitting   BP Method: Large (Manual)   Pulse: 88   Resp: 16   Temp: (!) 91.5 °F (33.1 °C)   TempSrc: Temporal   SpO2: 99%   Weight: (!) 183.2 kg (403 lb 14.1 oz)   Height: 5' 10" (1.778 m)     Physical Exam  Vitals reviewed.   Constitutional:       General: He is not in acute distress.     Appearance: Normal appearance. He is not ill-appearing.   HENT:      Head: Normocephalic and atraumatic.   Eyes:      General:         Right eye: No discharge.         Left eye: No discharge.      Conjunctiva/sclera: Conjunctivae normal.   Cardiovascular:      Rate and Rhythm: Normal rate.   Pulmonary:      Effort: Pulmonary effort is normal.   Musculoskeletal:         General: No deformity.      Right wrist: Normal. No swelling, deformity, effusion, lacerations, tenderness, bony tenderness, snuff box tenderness or crepitus. Normal range of motion. Normal pulse.      Left wrist: Normal. No swelling, deformity, effusion, lacerations, tenderness, bony tenderness, snuff box tenderness or crepitus. Normal range of motion. Normal pulse.      Left foot: Normal range of motion. No swelling, deformity, bunion, Charcot foot, laceration, tenderness or bony tenderness. Normal pulse.   Skin:     Coloration: Skin is not jaundiced or pale.      Comments: Shin to sole of left foot thick with punctate lesions   Neurological:      General: " "evaluation and treatment of her somatic concerns..  There is no psychotic determinates.     Attempted to contact her  but apparently he does not answer his phone till afternoon due to his work schedule.  Discussed this with the patient's therapist to follow-up, verify his intent to take the patient to see a gastroenterologist post release from here, and we can probably discharge the patient tomorrow.        Depression rating \"just because I am sick\"  Anxiety rating \"just because I am sick\"  Sleep: Slept well         Review of Systems   Constitutional:        Does appear emaciated.   Cardiovascular: Negative.    Gastrointestinal:        Says she cannot tolerate any good nutritional drink: \"my stomach blows up\"    Stool incontinence   Musculoskeletal: Positive for myalgias.         OBJECTIVE    Temp:  [97.8 °F (36.6 °C)-99.3 °F (37.4 °C)] 98.1 °F (36.7 °C)  Heart Rate:  [100-129] 125  Resp:  [16-20] 18  BP: (125-153)/(66-96) 125/77    MENTAL STATUS EXAM:      Appearance:Casually dressed, good hygeine.   Cooperation:Cooperative  Psychomotor: No psychomotor agitation/retardation, No EPS, No motor tics  Speech-normal rate, amount.   Mood/Affect:  Angry  Thought Processes:  coherent  Thought Content:  Preoccupied with her somatic complaints  Hallucination(s): none  Hopelessness: No  Optimistic:minimally  Suicidal Thoughts:   No  Suicidal Plan/Intent:  No  Homicidal Thoughts:  absent  Orientation: oriented x 3  Memory: recent intact    Lab Results (last 24 hours)     ** No results found for the last 24 hours. **           Imaging Results (Last 24 Hours)     ** No results found for the last 24 hours. **           ECG/EMG Results (most recent)     Procedure Component Value Units Date/Time    ECG 12 Lead [[id number]] Collected:  02/11/20 1640     Updated:  02/11/20 1849    Narrative:       Test Reason : Tachycardia  Blood Pressure : **/** mmHG  Vent. Rate : 099 BPM     Atrial Rate : 099 BPM     P-R Int : 106 ms          " "No focal deficit present.      Mental Status: He is alert and oriented to person, place, and time.   Psychiatric:         Mood and Affect: Mood normal.         Behavior: Behavior normal.             Lab Results   Component Value Date     02/02/2023    K 4.1 02/02/2023     02/02/2023    CO2 24 02/02/2023    BUN 18 02/02/2023    CREATININE 1.0 02/02/2023    ANIONGAP 11 02/02/2023     No results found for: "HGBA1C"  No results found for: "BNP", "BNPTRIAGEBLO"    Lab Results   Component Value Date    WBC 8.08 02/02/2023    HGB 14.1 02/02/2023    HCT 43.8 02/02/2023     02/02/2023    GRAN 4.8 02/02/2023    GRAN 59.9 02/02/2023     Lab Results   Component Value Date    CHOL 198 02/02/2023    HDL 35 (L) 02/02/2023    LDLCALC 133.0 02/02/2023    TRIG 150 02/02/2023          Current Outpatient Medications:     amLODIPine (NORVASC) 10 MG tablet, Take 1 tablet (10 mg total) by mouth once daily., Disp: 90 tablet, Rfl: 3    benazepriL (LOTENSIN) 10 MG tablet, Take 1 tablet (10 mg total) by mouth once daily., Disp: 90 tablet, Rfl: 3    budesonide (PULMICORT FLEXHALER) 90 mcg/actuation AePB, Inhale 1 puff (90 mcg total) into the lungs 2 (two) times a day. Controller, Disp: 1 each, Rfl: 1    promethazine-dextromethorphan (PROMETHAZINE-DM) 6.25-15 mg/5 mL Syrp, Take 10 mLs by mouth every 6 (six) hours as needed., Disp: , Rfl:     clotrimazole (LOTRIMIN) 1 % cream, Apply topically 2 (two) times daily., Disp: 60 g, Rfl: 0    pantoprazole (PROTONIX) 20 MG tablet, Take 1 tablet (20 mg total) by mouth once daily., Disp: 90 tablet, Rfl: 3        Assessment:       1. Right wrist pain    2. Chronic cough    3. Plantar fasciitis of left foot    4. Foot dermatitis           Plan:       Right wrist pain  -     X-Ray Wrist Complete 3 views Right; Future; Expected date: 08/25/2023  - Home exercises provided today's visit  - RICE therapy explained and recommended  - Meloxicam sent to pharmacy    Chronic cough  -     " QRS Dur : 080 ms      QT Int : 354 ms       P-R-T Axes : 032 072 062 degrees     QTc Int : 454 ms    Sinus rhythm with short NV  Nonspecific ST abnormality  Abnormal ECG  No previous ECGs available  Confirmed by Jovi Mehta (2020) on 2/11/2020 6:49:04 PM    Referred By:  JOHN           Confirmed By:Jovi Mehta           ALLERGIES: Morphine and related and Penicillins      Current Facility-Administered Medications:   •  acetaminophen (TYLENOL) tablet 650 mg, 650 mg, Oral, Q6H PRN, Camron Baker MD, 650 mg at 02/12/20 0731  •  aluminum-magnesium hydroxide-simethicone (MAALOX MAX) 400-400-40 MG/5ML suspension 15 mL, 15 mL, Oral, Q6H PRN, Camron Baker MD  •  benzonatate (TESSALON) capsule 100 mg, 100 mg, Oral, TID PRN, Camron Baker MD  •  benztropine (COGENTIN) tablet 2 mg, 2 mg, Oral, Once PRN **OR** benztropine (COGENTIN) injection 1 mg, 1 mg, Intramuscular, Once PRN, Camron Baker MD  •  clonazePAM (KlonoPIN) tablet 1 mg, 1 mg, Oral, TID, Darron Baker MD, 1 mg at 02/12/20 0818  •  cyclobenzaprine (FLEXERIL) tablet 10 mg, 10 mg, Oral, TID PRN, Camron Baker MD  •  dicyclomine (BENTYL) capsule 10 mg, 10 mg, Oral, TID PRN, Camron Baker MD  •  famotidine (PEPCID) tablet 20 mg, 20 mg, Oral, BID PRN, Camron Baker MD  •  gabapentin (NEURONTIN) capsule 300 mg, 300 mg, Oral, TID, Darron Baker MD, 300 mg at 02/12/20 0818  •  hydrOXYzine (ATARAX) tablet 50 mg, 50 mg, Oral, Q6H PRN, Camron Baker MD, 50 mg at 02/11/20 1117  •  ibuprofen (ADVIL,MOTRIN) tablet 400 mg, 400 mg, Oral, Q6H PRN, Camron Baker MD  •  loperamide (IMODIUM) capsule 2 mg, 2 mg, Oral, Q2H PRN, Camron Baker MD  •  magnesium hydroxide (MILK OF MAGNESIA) suspension 2400 mg/10mL 10 mL, 10 mL, Oral, Daily PRN, Camron Baker MD  •  metoclopramide (REGLAN) tablet 5 mg, 5 mg, Oral, TID AC, Darron Baker MD  •  mirtazapine (REMERON) tablet 15 mg, 15 mg, Oral,  Nightly, Darron Baker MD, 15 mg at 02/11/20 2106  •  ondansetron ODT (ZOFRAN-ODT) disintegrating tablet 4 mg, 4 mg, Oral, Q8H PRN, Darron Baker MD, 4 mg at 02/12/20 0811  •  oxyCODONE-acetaminophen (PERCOCET) 7.5-325 MG per tablet 1 tablet, 1 tablet, Oral, BID PRN, Darron Baker MD, 1 tablet at 02/12/20 0819  •  potassium chloride (K-DUR,KLOR-CON) CR tablet 40 mEq, 40 mEq, Oral, PRN, Camron Baker MD  •  potassium chloride (K-DUR,KLOR-CON) CR tablet 40 mEq, 40 mEq, Oral, Once, Camron Baker MD  •  potassium chloride (KLOR-CON) packet 40 mEq, 40 mEq, Oral, PRN, Camron Baker MD  •  sodium chloride nasal spray 2 spray, 2 spray, Each Nare, Olivia DEAL Brian T, MD  •  traZODone (DESYREL) tablet 50 mg, 50 mg, Oral, Nightly PRN, Camron Baker MD    ASSESSMENT & PLAN      Other recurrent depressive disorders (CMS/HCC)  Patient is on special precautions, will be starting mirtazapine plus individual and group psychotherapy   Cluster B personality disorder.   Will incorporate into the psychotherapeutic effort.    Crohn's disease (CMS/HCC)  Will evaluate for current status    Chronic neck pain  Patient status post cervical fusion surgery, has been prescribed oxycodone for the past 4 years allowing 20 mg/day on a as needed basis.  For now will utilize PRN oxycodone 7.5 mg twice daily as needed    Gastroparesis  We will try low-dose Reglan  Fibromyalgia  We will continue the gabapentin but at a lower dose      Special precautions: Special Precautions Level 3 (q15 min checks)     Behavioral Health Treatment Plan and Problem List: I have reviewed and approved the Behavioral Health Treatment Plan and Problem list.    I spent a total of 50 minutes in direct patient care including  45 minutes face to face with the patient assessment, coordination of care, and counseling the patient on the current and follow-up treatment plans regarding her clinical situation.   ELISHA Claudio  pantoprazole (PROTONIX) 20 MG tablet; Take 1 tablet (20 mg total) by mouth once daily.  Dispense: 90 tablet; Refill: 3    Plantar fasciitis of left foot  Foot dermatitis  -     Ambulatory referral/consult to Podiatry; Future; Expected date: 09/01/2023  -     clotrimazole (LOTRIMIN) 1 % cream; Apply topically 2 (two) times daily.  Dispense: 60 g; Refill: 0  - AAOS home exercises provided today's visit  - RICE therapy explained and recommended  - Meloxicam sent to pharmacy  - Uncertain cause of Dermatitis left foot and will refer to Specialist    RTC PRN     Olivia SWIFT    Clinician:  Darron Baker MD  02/12/20  10:52 AM    Dictated utilizing Dragon dictation

## 2023-09-18 ENCOUNTER — PATIENT MESSAGE (OUTPATIENT)
Dept: PRIMARY CARE CLINIC | Facility: CLINIC | Age: 33
End: 2023-09-18
Payer: COMMERCIAL

## 2023-10-06 ENCOUNTER — TELEPHONE (OUTPATIENT)
Dept: PODIATRY | Facility: CLINIC | Age: 33
End: 2023-10-06
Payer: COMMERCIAL

## 2023-10-18 ENCOUNTER — PATIENT MESSAGE (OUTPATIENT)
Dept: CARDIOLOGY | Facility: CLINIC | Age: 33
End: 2023-10-18
Payer: COMMERCIAL

## 2023-11-16 ENCOUNTER — OCCUPATIONAL HEALTH (OUTPATIENT)
Dept: URGENT CARE | Facility: CLINIC | Age: 33
End: 2023-11-16

## 2023-11-16 ENCOUNTER — OFFICE VISIT (OUTPATIENT)
Dept: PODIATRY | Facility: CLINIC | Age: 33
End: 2023-11-16
Payer: COMMERCIAL

## 2023-11-16 VITALS
WEIGHT: 315 LBS | DIASTOLIC BLOOD PRESSURE: 99 MMHG | BODY MASS INDEX: 45.1 KG/M2 | HEART RATE: 71 BPM | HEIGHT: 70 IN | SYSTOLIC BLOOD PRESSURE: 158 MMHG

## 2023-11-16 DIAGNOSIS — E66.01 CLASS 3 SEVERE OBESITY WITHOUT SERIOUS COMORBIDITY WITH BODY MASS INDEX (BMI) OF 50.0 TO 59.9 IN ADULT, UNSPECIFIED OBESITY TYPE: ICD-10-CM

## 2023-11-16 DIAGNOSIS — B35.3 TINEA PEDIS OF BOTH FEET: ICD-10-CM

## 2023-11-16 DIAGNOSIS — Z13.9 ENCOUNTER FOR SCREENING: Primary | ICD-10-CM

## 2023-11-16 DIAGNOSIS — L84 PLANTAR CALLUS: ICD-10-CM

## 2023-11-16 DIAGNOSIS — L29.9 ITCHING: Primary | ICD-10-CM

## 2023-11-16 DIAGNOSIS — L74.513 HYPERHIDROSIS OF SOLES: ICD-10-CM

## 2023-11-16 DIAGNOSIS — L30.9 FOOT DERMATITIS: ICD-10-CM

## 2023-11-16 PROCEDURE — 99999 PR PBB SHADOW E&M-EST. PATIENT-LVL III: ICD-10-PCS | Mod: PBBFAC,,, | Performed by: PODIATRIST

## 2023-11-16 PROCEDURE — 1159F PR MEDICATION LIST DOCUMENTED IN MEDICAL RECORD: ICD-10-PCS | Mod: CPTII,S$GLB,, | Performed by: PODIATRIST

## 2023-11-16 PROCEDURE — 3080F PR MOST RECENT DIASTOLIC BLOOD PRESSURE >= 90 MM HG: ICD-10-PCS | Mod: CPTII,S$GLB,, | Performed by: PODIATRIST

## 2023-11-16 PROCEDURE — 4010F ACE/ARB THERAPY RXD/TAKEN: CPT | Mod: CPTII,S$GLB,, | Performed by: PODIATRIST

## 2023-11-16 PROCEDURE — 99199 UNLISTED SPECIAL SVC PX/RPRT: CPT | Mod: S$GLB,,,

## 2023-11-16 PROCEDURE — 3008F BODY MASS INDEX DOCD: CPT | Mod: CPTII,S$GLB,, | Performed by: PODIATRIST

## 2023-11-16 PROCEDURE — 99999 PR PBB SHADOW E&M-EST. PATIENT-LVL III: CPT | Mod: PBBFAC,,, | Performed by: PODIATRIST

## 2023-11-16 PROCEDURE — 80305 DRUG TEST PRSMV DIR OPT OBS: CPT | Mod: S$GLB,,,

## 2023-11-16 PROCEDURE — 3077F SYST BP >= 140 MM HG: CPT | Mod: CPTII,S$GLB,, | Performed by: PODIATRIST

## 2023-11-16 PROCEDURE — 3077F PR MOST RECENT SYSTOLIC BLOOD PRESSURE >= 140 MM HG: ICD-10-PCS | Mod: CPTII,S$GLB,, | Performed by: PODIATRIST

## 2023-11-16 PROCEDURE — 99203 OFFICE O/P NEW LOW 30 MIN: CPT | Mod: S$GLB,,, | Performed by: PODIATRIST

## 2023-11-16 PROCEDURE — 80305 OOH COLLECTION ONLY DRUG SCREEN: ICD-10-PCS | Mod: S$GLB,,,

## 2023-11-16 PROCEDURE — 99203 PR OFFICE/OUTPT VISIT, NEW, LEVL III, 30-44 MIN: ICD-10-PCS | Mod: S$GLB,,, | Performed by: PODIATRIST

## 2023-11-16 PROCEDURE — 4010F PR ACE/ARB THEARPY RXD/TAKEN: ICD-10-PCS | Mod: CPTII,S$GLB,, | Performed by: PODIATRIST

## 2023-11-16 PROCEDURE — 99199 OCC MED MRO FEE: ICD-10-PCS | Mod: S$GLB,,,

## 2023-11-16 PROCEDURE — 1159F MED LIST DOCD IN RCRD: CPT | Mod: CPTII,S$GLB,, | Performed by: PODIATRIST

## 2023-11-16 PROCEDURE — 3080F DIAST BP >= 90 MM HG: CPT | Mod: CPTII,S$GLB,, | Performed by: PODIATRIST

## 2023-11-16 PROCEDURE — 3008F PR BODY MASS INDEX (BMI) DOCUMENTED: ICD-10-PCS | Mod: CPTII,S$GLB,, | Performed by: PODIATRIST

## 2023-11-16 RX ORDER — CLOTRIMAZOLE 1 %
CREAM (GRAM) TOPICAL 2 TIMES DAILY
Qty: 85 G | Refills: 2 | Status: SHIPPED | OUTPATIENT
Start: 2023-11-16

## 2023-11-16 NOTE — PATIENT INSTRUCTIONS
Dry solid men's antiperspirant to soles of feet daily after bathing/ soaking or twice daily..    Listerine mouthwash (yellow/gold) - soak for 5-10minutes daily (may re-use solution up to a week) if preferred.    Urea 40 % cream to feet after soaking or bathing daily. (Flexitol heel balm/ foot cream)

## 2023-11-16 NOTE — PROGRESS NOTES
Subjective:      Patient ID: Prabhakar Moss Jr. is a 32 y.o. male.    Chief Complaint: Dermatitis    Patient is new to this clinic & seen on referral from PCP. Had pain L lateral foot 1st thing in am x 3 months but resolved w/ new work boot; climbs ladders. Now has callus & itching arch when WB only - PCP prescribed a topical cream.. Admits to hyperhidrosis as in steel-toe shoes or Crocs.    PCP Khari Vargas MD 8/25/23    Past Medical History:   Diagnosis Date    Hypertension      Patient Active Problem List   Diagnosis    Class 3 severe obesity without serious comorbidity with body mass index (BMI) of 50.0 to 59.9 in adult      Objective:      Review of Systems   Constitutional: Negative for malaise/fatigue.   Skin:  Positive for dry skin, itching and rash. Negative for color change and suspicious lesions.   Musculoskeletal:  Positive for myalgias (resolved L LLA). Negative for falls and joint pain.   Psychiatric/Behavioral:  The patient is not nervous/anxious.      Physical Exam  Vitals reviewed.   Constitutional:       Appearance: He is well-developed. He is morbidly obese.   Cardiovascular:      Pulses:           Dorsalis pedis pulses are 2+ on the right side and 2+ on the left side.   Musculoskeletal:         General: No swelling, tenderness or signs of injury.      Right lower leg: Edema present.      Left lower leg: Edema present.   Feet:      Right foot:      Skin integrity: Callus and dry skin present. No erythema or warmth.      Left foot:      Skin integrity: Callus and dry skin present. No erythema or warmth.   Skin:     General: Skin is warm and moist.      Capillary Refill: Capillary refill takes less than 2 seconds.      Findings: Lesion and rash present. No bruising or erythema. Rash is scaling.      Comments: Patchy scaling w/ WB diffuse hyperkeratosis w/ mx porokeratoses.   Neurological:      Mental Status: He is alert and oriented to person, place, and time.      Sensory: Sensation is  intact.      Motor: Motor function is intact. No weakness or abnormal muscle tone.      Gait: Gait is intact. Gait normal.   Psychiatric:         Mood and Affect: Mood and affect normal.         Behavior: Behavior normal. Behavior is cooperative.         Assessment:      Encounter Diagnoses   Name Primary?    Foot dermatitis     Itching Yes    Plantar callus     Hyperhidrosis of soles     Class 3 severe obesity without serious comorbidity with body mass index (BMI) of 50.0 to 59.9 in adult, unspecified obesity type     Tinea pedis of both feet        Problem List Items Addressed This Visit          Endocrine    Class 3 severe obesity without serious comorbidity with body mass index (BMI) of 50.0 to 59.9 in adult    Current Assessment & Plan     BMI 56.21kg/m2 - follows w/ PCP          Other Visit Diagnoses       Itching    -  Primary    Foot dermatitis        Plantar callus        Hyperhidrosis of soles        Tinea pedis of both feet        Relevant Medications    clotrimazole (LOTRIMIN) 1 % cream           Plan:       Prabhakar was seen today for dermatitis.    Diagnoses and all orders for this visit:    Itching    Foot dermatitis  -     Ambulatory referral/consult to Podiatry    Plantar callus    Hyperhidrosis of soles    Class 3 severe obesity without serious comorbidity with body mass index (BMI) of 50.0 to 59.9 in adult, unspecified obesity type    Tinea pedis of both feet  -     Ambulatory referral/consult to Podiatry  -     clotrimazole (LOTRIMIN) 1 % cream; Apply topically 2 (two) times daily.    I counseled the patient on his conditions, their implications & medical mgmt.    Advised on use of OTC topical men's antiperspitrant qd after bathing.    Continue Clotrimazole per PCP.    May soak feet qd in Listerine (yellow) to soften & clean skin prior to applying urea 40% (now OTC) qd for @ least 2-4 wks.    F/U prn.        A total of 30 mins.was spent on chart review, patient visit & documentation.

## 2023-11-27 PROBLEM — E66.813 CLASS 3 SEVERE OBESITY WITHOUT SERIOUS COMORBIDITY WITH BODY MASS INDEX (BMI) OF 50.0 TO 59.9 IN ADULT: Status: ACTIVE | Noted: 2023-11-27

## 2023-11-27 PROBLEM — E66.01 CLASS 3 SEVERE OBESITY WITHOUT SERIOUS COMORBIDITY WITH BODY MASS INDEX (BMI) OF 50.0 TO 59.9 IN ADULT: Status: ACTIVE | Noted: 2023-11-27

## 2023-11-30 ENCOUNTER — TELEPHONE (OUTPATIENT)
Dept: PRIMARY CARE CLINIC | Facility: CLINIC | Age: 33
End: 2023-11-30
Payer: COMMERCIAL

## 2023-11-30 NOTE — TELEPHONE ENCOUNTER
Called pt. To get an updated home bp reading . Patient stated that he was at work but to call back at around 3:15p this afternoon and he will be home to check his bp.

## 2024-01-12 ENCOUNTER — PATIENT MESSAGE (OUTPATIENT)
Dept: ADMINISTRATIVE | Facility: HOSPITAL | Age: 34
End: 2024-01-12
Payer: COMMERCIAL

## 2024-01-29 ENCOUNTER — OFFICE VISIT (OUTPATIENT)
Dept: PRIMARY CARE CLINIC | Facility: CLINIC | Age: 34
End: 2024-01-29
Payer: COMMERCIAL

## 2024-01-29 VITALS
DIASTOLIC BLOOD PRESSURE: 90 MMHG | HEIGHT: 70 IN | WEIGHT: 315 LBS | BODY MASS INDEX: 45.1 KG/M2 | SYSTOLIC BLOOD PRESSURE: 170 MMHG | TEMPERATURE: 97 F | RESPIRATION RATE: 18 BRPM | HEART RATE: 94 BPM | OXYGEN SATURATION: 97 %

## 2024-01-29 DIAGNOSIS — I10 BENIGN ESSENTIAL HTN: ICD-10-CM

## 2024-01-29 DIAGNOSIS — Z23 NEED FOR VACCINATION: Primary | ICD-10-CM

## 2024-01-29 PROCEDURE — 3008F BODY MASS INDEX DOCD: CPT | Mod: CPTII,S$GLB,, | Performed by: STUDENT IN AN ORGANIZED HEALTH CARE EDUCATION/TRAINING PROGRAM

## 2024-01-29 PROCEDURE — 1160F RVW MEDS BY RX/DR IN RCRD: CPT | Mod: CPTII,S$GLB,, | Performed by: STUDENT IN AN ORGANIZED HEALTH CARE EDUCATION/TRAINING PROGRAM

## 2024-01-29 PROCEDURE — 3077F SYST BP >= 140 MM HG: CPT | Mod: CPTII,S$GLB,, | Performed by: STUDENT IN AN ORGANIZED HEALTH CARE EDUCATION/TRAINING PROGRAM

## 2024-01-29 PROCEDURE — 90686 IIV4 VACC NO PRSV 0.5 ML IM: CPT | Mod: S$GLB,,, | Performed by: STUDENT IN AN ORGANIZED HEALTH CARE EDUCATION/TRAINING PROGRAM

## 2024-01-29 PROCEDURE — 1159F MED LIST DOCD IN RCRD: CPT | Mod: CPTII,S$GLB,, | Performed by: STUDENT IN AN ORGANIZED HEALTH CARE EDUCATION/TRAINING PROGRAM

## 2024-01-29 PROCEDURE — 99999 PR PBB SHADOW E&M-EST. PATIENT-LVL IV: CPT | Mod: PBBFAC,,, | Performed by: STUDENT IN AN ORGANIZED HEALTH CARE EDUCATION/TRAINING PROGRAM

## 2024-01-29 PROCEDURE — G0008 ADMIN INFLUENZA VIRUS VAC: HCPCS | Mod: S$GLB,,, | Performed by: STUDENT IN AN ORGANIZED HEALTH CARE EDUCATION/TRAINING PROGRAM

## 2024-01-29 PROCEDURE — 99214 OFFICE O/P EST MOD 30 MIN: CPT | Mod: S$GLB,,, | Performed by: STUDENT IN AN ORGANIZED HEALTH CARE EDUCATION/TRAINING PROGRAM

## 2024-01-29 PROCEDURE — 3080F DIAST BP >= 90 MM HG: CPT | Mod: CPTII,S$GLB,, | Performed by: STUDENT IN AN ORGANIZED HEALTH CARE EDUCATION/TRAINING PROGRAM

## 2024-01-29 RX ORDER — BENAZEPRIL HYDROCHLORIDE 20 MG/1
20 TABLET ORAL DAILY
Qty: 90 TABLET | Refills: 3 | Status: SHIPPED | OUTPATIENT
Start: 2024-01-29 | End: 2024-03-15 | Stop reason: SDUPTHER

## 2024-01-29 RX ORDER — PANTOPRAZOLE SODIUM 40 MG/1
40 TABLET, DELAYED RELEASE ORAL DAILY
COMMUNITY

## 2024-01-29 NOTE — PROGRESS NOTES
"Subjective:       Patient ID: Prabhakar Moss Jr. is a 33 y.o. male.    Chief Complaint: Follow-up (3 month appt. )    HPI:  33 y.o. male presents to [facility] for 3 month follow-up visit.    Reports didn't sleep much last night and took pre-workout today prior to visit. Took BP medication 2 hours ago, off schedule    Acute concerns?: None today's visit    Has been taking his blood pressure medication on time most days.    HTN:  Home BP log?: Not too high at home, majority of systolic < 140, doesn't check a lot  Medications: amlodipine 10 mg, benazepril 10 mg  Compliance?: Missed about twice  Diet?: Trying to reduce calories and eat more protein  Exercise?: Power lifting at this time.      Patient reports since last seen in clinic, plantar fasciitis has been OK since last seen in clinic. New soles    Right wrist pain now is improved, but still doesn't have full movement. Working at gym to stretch and work out    Review of Systems   Constitutional:  Negative for chills, diaphoresis, fatigue and fever.   HENT:  Negative for congestion, sinus pressure, sneezing and sore throat.    Respiratory:  Positive for shortness of breath (Feels weight related. WIll increase Cardio exercises). Negative for cough.    Cardiovascular:  Negative for chest pain and palpitations.   Gastrointestinal:  Negative for abdominal pain, diarrhea, nausea and vomiting.   Musculoskeletal:  Positive for arthralgias (Right wrist, improved). Negative for joint swelling and myalgias.   Skin:  Positive for wound (Pinched left hand in weights).       Objective:      Vitals:    01/29/24 1511   BP: (!) 170/92   BP Location: Left arm   Patient Position: Sitting   BP Method: Large (Manual)   Pulse: 94   Resp: 18   Temp: 97.3 °F (36.3 °C)   TempSrc: Temporal   SpO2: 97%   Weight: (!) 188.3 kg (415 lb 2 oz)   Height: 5' 10" (1.778 m)     Physical Exam  Vitals reviewed.   Constitutional:       General: He is not in acute distress.     Appearance: Normal " "appearance. He is not ill-appearing.   HENT:      Head: Normocephalic and atraumatic.   Eyes:      General:         Right eye: No discharge.         Left eye: No discharge.      Conjunctiva/sclera: Conjunctivae normal.   Cardiovascular:      Rate and Rhythm: Normal rate and regular rhythm.      Heart sounds: Normal heart sounds. No murmur heard.  Pulmonary:      Effort: Pulmonary effort is normal.      Breath sounds: Normal breath sounds.   Musculoskeletal:         General: No deformity.   Skin:     General: Skin is warm and dry.      Coloration: Skin is not jaundiced.   Neurological:      General: No focal deficit present.      Mental Status: He is alert and oriented to person, place, and time.   Psychiatric:         Mood and Affect: Mood normal.         Behavior: Behavior normal.             Lab Results   Component Value Date     02/02/2023    K 4.1 02/02/2023     02/02/2023    CO2 24 02/02/2023    BUN 18 02/02/2023    CREATININE 1.0 02/02/2023    ANIONGAP 11 02/02/2023     No results found for: "HGBA1C"  No results found for: "BNP", "BNPTRIAGEBLO"    Lab Results   Component Value Date    WBC 8.08 02/02/2023    HGB 14.1 02/02/2023    HCT 43.8 02/02/2023     02/02/2023    GRAN 4.8 02/02/2023    GRAN 59.9 02/02/2023     Lab Results   Component Value Date    CHOL 198 02/02/2023    HDL 35 (L) 02/02/2023    LDLCALC 133.0 02/02/2023    TRIG 150 02/02/2023          Current Outpatient Medications:     amLODIPine (NORVASC) 10 MG tablet, Take 1 tablet (10 mg total) by mouth once daily., Disp: 90 tablet, Rfl: 3    budesonide (PULMICORT FLEXHALER) 90 mcg/actuation AePB, Inhale 1 puff (90 mcg total) into the lungs 2 (two) times a day. Controller, Disp: 1 each, Rfl: 1    pantoprazole (PROTONIX) 40 MG tablet, Take 40 mg by mouth once daily., Disp: , Rfl:     benazepriL (LOTENSIN) 20 MG tablet, Take 1 tablet (20 mg total) by mouth once daily., Disp: 90 tablet, Rfl: 3    clotrimazole (LOTRIMIN) 1 % cream, Apply " topically 2 (two) times daily. (Patient not taking: Reported on 1/29/2024), Disp: 85 g, Rfl: 2        Assessment:       1. Benign essential HTN           Plan:       Annual physical exam  Benign essential HTN  -     benazepriL (LOTENSIN) 20 MG tablet; Take 1 tablet (20 mg total) by mouth once daily.  Dispense: 90 tablet; Refill: 3  - Lifestyle BP control recommendations provided today's visit  - Health maintenance items reviewed today's visit    RTC in 6 months

## 2024-03-15 DIAGNOSIS — I10 BENIGN ESSENTIAL HTN: ICD-10-CM

## 2024-03-16 NOTE — TELEPHONE ENCOUNTER
No care due was identified.  [facility] Embedded Care Due Messages. Reference number: [id number].   3/15/2024 7:26:55 PM CDT

## 2024-03-18 RX ORDER — BENAZEPRIL HYDROCHLORIDE 20 MG/1
20 TABLET ORAL DAILY
Qty: 90 TABLET | Refills: 0 | Status: SHIPPED | OUTPATIENT
Start: 2024-03-18 | End: 2024-05-28 | Stop reason: SDUPTHER

## 2024-05-28 DIAGNOSIS — I10 BENIGN ESSENTIAL HTN: ICD-10-CM

## 2024-05-28 PROBLEM — M54.50 ACUTE LEFT-SIDED LOW BACK PAIN: Status: ACTIVE | Noted: 2024-05-28

## 2024-05-28 NOTE — TELEPHONE ENCOUNTER
No care due was identified.  [facility] Embedded Care Due Messages. Reference number: [id number].   5/28/2024 6:16:35 AM CDT

## 2024-05-29 RX ORDER — BENAZEPRIL HYDROCHLORIDE 20 MG/1
20 TABLET ORAL DAILY
Qty: 90 TABLET | Refills: 2 | Status: SHIPPED | OUTPATIENT
Start: 2024-05-29

## 2024-05-29 NOTE — TELEPHONE ENCOUNTER
Refill Routing Note   Medication(s) are not appropriate for processing by [facility] for the following reason(s):        Required vitals abnormal: BP (!) 172/78     [facility] action(s):  Defer      Medication Therapy Plan:         Appointments  past 12m or future 3m with PCP    Date Provider   Last Visit   1/29/2024 Khari Vargas MD   Next Visit   7/29/2024 Khari Vargas MD   ED visits in past 90 days: 1        Note composed:8:32 AM 05/29/2024

## 2024-08-05 ENCOUNTER — OCCUPATIONAL HEALTH (OUTPATIENT)
Dept: URGENT CARE | Facility: CLINIC | Age: 34
End: 2024-08-05

## 2024-08-05 DIAGNOSIS — Z02.89 ENCOUNTER FOR EXAMINATION REQUIRED BY DEPARTMENT OF TRANSPORTATION (DOT): Primary | ICD-10-CM

## 2024-08-05 RX ORDER — PANTOPRAZOLE SODIUM 20 MG/1
20 TABLET, DELAYED RELEASE ORAL EVERY MORNING
COMMUNITY
Start: 2024-04-13

## 2024-09-17 DIAGNOSIS — I10 BENIGN ESSENTIAL HTN: ICD-10-CM

## 2024-09-17 RX ORDER — BENAZEPRIL HYDROCHLORIDE 20 MG/1
20 TABLET ORAL DAILY
Qty: 90 TABLET | Refills: 0 | Status: SHIPPED | OUTPATIENT
Start: 2024-09-17

## 2024-09-17 NOTE — TELEPHONE ENCOUNTER
No care due was identified.  [facility] Embedded Care Due Messages. Reference number: [id number].   9/17/2024 6:48:30 AM CDT

## 2024-09-19 ENCOUNTER — PATIENT MESSAGE (OUTPATIENT)
Dept: PRIMARY CARE CLINIC | Facility: CLINIC | Age: 34
End: 2024-09-19
Payer: COMMERCIAL

## 2024-10-16 ENCOUNTER — PATIENT MESSAGE (OUTPATIENT)
Dept: ADMINISTRATIVE | Facility: HOSPITAL | Age: 34
End: 2024-10-16
Payer: COMMERCIAL

## 2024-11-09 DIAGNOSIS — I10 BENIGN ESSENTIAL HTN: ICD-10-CM

## 2024-11-09 NOTE — TELEPHONE ENCOUNTER
No care due was identified.  [facility] Embedded Care Due Messages. Reference number: [id number].   11/09/2024 9:17:18 AM CST

## 2024-11-11 ENCOUNTER — OCCUPATIONAL HEALTH (OUTPATIENT)
Dept: URGENT CARE | Facility: CLINIC | Age: 34
End: 2024-11-11

## 2024-11-11 DIAGNOSIS — Z13.9 ENCOUNTER FOR SCREENING: Primary | ICD-10-CM

## 2024-11-11 RX ORDER — BENAZEPRIL HYDROCHLORIDE 20 MG/1
20 TABLET ORAL DAILY
Qty: 90 TABLET | Refills: 0 | Status: SHIPPED | OUTPATIENT
Start: 2024-11-11

## 2024-12-19 ENCOUNTER — PATIENT MESSAGE (OUTPATIENT)
Dept: ADMINISTRATIVE | Facility: HOSPITAL | Age: 34
End: 2024-12-19
Payer: COMMERCIAL

## 2025-01-22 DIAGNOSIS — I10 BENIGN ESSENTIAL HTN: ICD-10-CM

## 2025-01-22 NOTE — TELEPHONE ENCOUNTER
No care due was identified.  [facility] Embedded Care Due Messages. Reference number: [id number].   1/22/2025 12:50:26 PM CST

## 2025-01-24 RX ORDER — BENAZEPRIL HYDROCHLORIDE 20 MG/1
20 TABLET ORAL DAILY
Qty: 90 TABLET | Refills: 0 | Status: SHIPPED | OUTPATIENT
Start: 2025-01-24 | End: 2025-01-25

## 2025-01-25 ENCOUNTER — OFFICE VISIT (OUTPATIENT)
Dept: PRIMARY CARE CLINIC | Facility: CLINIC | Age: 35
End: 2025-01-25
Payer: COMMERCIAL

## 2025-01-25 VITALS
HEIGHT: 70 IN | OXYGEN SATURATION: 97 % | BODY MASS INDEX: 45.1 KG/M2 | TEMPERATURE: 97 F | SYSTOLIC BLOOD PRESSURE: 182 MMHG | DIASTOLIC BLOOD PRESSURE: 80 MMHG | WEIGHT: 315 LBS | RESPIRATION RATE: 18 BRPM | HEART RATE: 101 BPM

## 2025-01-25 DIAGNOSIS — J20.9 ACUTE BRONCHITIS, UNSPECIFIED ORGANISM: Primary | ICD-10-CM

## 2025-01-25 DIAGNOSIS — I10 BENIGN ESSENTIAL HTN: ICD-10-CM

## 2025-01-25 DIAGNOSIS — J11.1 FLU: ICD-10-CM

## 2025-01-25 LAB
CTP QC/QA: YES
POC MOLECULAR INFLUENZA A AGN: POSITIVE
POC MOLECULAR INFLUENZA B AGN: NEGATIVE

## 2025-01-25 PROCEDURE — 3079F DIAST BP 80-89 MM HG: CPT | Mod: CPTII,S$GLB,, | Performed by: STUDENT IN AN ORGANIZED HEALTH CARE EDUCATION/TRAINING PROGRAM

## 2025-01-25 PROCEDURE — 99999 PR PBB SHADOW E&M-EST. PATIENT-LVL III: CPT | Mod: PBBFAC,,, | Performed by: STUDENT IN AN ORGANIZED HEALTH CARE EDUCATION/TRAINING PROGRAM

## 2025-01-25 PROCEDURE — 87502 INFLUENZA DNA AMP PROBE: CPT | Mod: QW,S$GLB,, | Performed by: STUDENT IN AN ORGANIZED HEALTH CARE EDUCATION/TRAINING PROGRAM

## 2025-01-25 PROCEDURE — 99214 OFFICE O/P EST MOD 30 MIN: CPT | Mod: S$GLB,,, | Performed by: STUDENT IN AN ORGANIZED HEALTH CARE EDUCATION/TRAINING PROGRAM

## 2025-01-25 PROCEDURE — 3008F BODY MASS INDEX DOCD: CPT | Mod: CPTII,S$GLB,, | Performed by: STUDENT IN AN ORGANIZED HEALTH CARE EDUCATION/TRAINING PROGRAM

## 2025-01-25 PROCEDURE — 1160F RVW MEDS BY RX/DR IN RCRD: CPT | Mod: CPTII,S$GLB,, | Performed by: STUDENT IN AN ORGANIZED HEALTH CARE EDUCATION/TRAINING PROGRAM

## 2025-01-25 PROCEDURE — 1159F MED LIST DOCD IN RCRD: CPT | Mod: CPTII,S$GLB,, | Performed by: STUDENT IN AN ORGANIZED HEALTH CARE EDUCATION/TRAINING PROGRAM

## 2025-01-25 PROCEDURE — 3077F SYST BP >= 140 MM HG: CPT | Mod: CPTII,S$GLB,, | Performed by: STUDENT IN AN ORGANIZED HEALTH CARE EDUCATION/TRAINING PROGRAM

## 2025-01-25 PROCEDURE — 4010F ACE/ARB THERAPY RXD/TAKEN: CPT | Mod: CPTII,S$GLB,, | Performed by: STUDENT IN AN ORGANIZED HEALTH CARE EDUCATION/TRAINING PROGRAM

## 2025-01-25 RX ORDER — VALSARTAN 160 MG/1
160 TABLET ORAL DAILY
Qty: 90 TABLET | Refills: 3 | Status: SHIPPED | OUTPATIENT
Start: 2025-01-25 | End: 2026-01-25

## 2025-01-25 RX ORDER — OSELTAMIVIR PHOSPHATE 75 MG/1
75 CAPSULE ORAL 2 TIMES DAILY
Qty: 10 CAPSULE | Refills: 0 | Status: SHIPPED | OUTPATIENT
Start: 2025-01-25 | End: 2025-01-30

## 2025-01-25 NOTE — PROGRESS NOTES
"Subjective:       Patient ID: Prabhakar Moss Jr. is a 34 y.o. male.    Chief Complaint: Annual Exam    HPI:  34 y.o. male presents to [facility] for annual exam    Acute concerns?: Has been out of blood pressure medication and now running high    HTN:  Home BP log?: Was checking on benazepril and was controlled.  Medications: benazepril 20 mg  Compliance?: Has been out past few weeks  Diet?: No specific  Exercise?: Does power lifting and cardio. Does 4-5 days per week 1 hour    Flu vaccine?: Amenable    Not feeling feverish. Has had cold symptoms since 2024. Cough with non-bloody mucus. Hasn't taken any medications. Still has cough medication that is not  and will use.    Sick contacts?: Did have sick co-worker  Fever?: No  Shortness of breath?: No  Loss of taste smell?: No  Received flu vaccine?: Not UTD  Received COVID vaccine?: 2 doses in past, has never had COVID      Review of Systems   Constitutional:  Positive for chills. Negative for diaphoresis, fatigue and fever.   HENT:  Positive for congestion and sinus pressure. Negative for sinus pain and sore throat.    Respiratory:  Positive for cough. Negative for shortness of breath.    Cardiovascular:  Negative for chest pain and palpitations.   Gastrointestinal:  Negative for abdominal pain, diarrhea, nausea and vomiting.   Skin:  Negative for rash and wound.   Neurological:  Positive for headaches. Negative for dizziness and light-headedness.       Objective:      Vitals:    25 1123   BP: (!) 182/80   BP Location: Right arm   Patient Position: Sitting   Pulse: 101   Resp: 18   Temp: 97.2 °F (36.2 °C)   TempSrc: Temporal   SpO2: 97%   Weight: (!) 187.2 kg (412 lb 11.2 oz)   Height: 5' 10" (1.778 m)     Physical Exam  Vitals reviewed.   Constitutional:       General: He is not in acute distress.     Appearance: Normal appearance. He is not ill-appearing.   HENT:      Head: Normocephalic and atraumatic.      Nose:      Right Sinus: Maxillary " "sinus tenderness present. No frontal sinus tenderness.      Left Sinus: Maxillary sinus tenderness present. No frontal sinus tenderness.   Eyes:      General:         Right eye: No discharge.         Left eye: No discharge.      Conjunctiva/sclera: Conjunctivae normal.   Cardiovascular:      Rate and Rhythm: Normal rate and regular rhythm.      Pulses: Normal pulses.      Heart sounds: No murmur heard.  Pulmonary:      Effort: Pulmonary effort is normal.      Breath sounds: Normal breath sounds.   Musculoskeletal:         General: No deformity.      Cervical back: Neck supple. No rigidity.   Lymphadenopathy:      Cervical: No cervical adenopathy.   Skin:     General: Skin is warm and dry.      Coloration: Skin is not jaundiced.   Neurological:      General: No focal deficit present.      Mental Status: He is alert and oriented to person, place, and time.   Psychiatric:         Mood and Affect: Mood normal.         Behavior: Behavior normal.             Lab Results   Component Value Date     05/28/2024    K 4.2 05/28/2024     05/28/2024    CO2 24 05/28/2024    BUN 11 05/28/2024    CREATININE 0.9 05/28/2024    ANIONGAP 9 05/28/2024     No results found for: "HGBA1C"  No results found for: "BNP", "BNPTRIAGEBLO"    Lab Results   Component Value Date    WBC 9.80 05/28/2024    HGB 14.3 05/28/2024    HCT 44.3 05/28/2024     05/28/2024    GRAN 6.6 05/28/2024    GRAN 67.4 05/28/2024     Lab Results   Component Value Date    CHOL 198 02/02/2023    HDL 35 (L) 02/02/2023    LDLCALC 133.0 02/02/2023    TRIG 150 02/02/2023          Current Outpatient Medications:     pantoprazole (PROTONIX) 40 MG tablet, Take 40 mg by mouth once daily., Disp: , Rfl:     oseltamivir (TAMIFLU) 75 MG capsule, Take 1 capsule (75 mg total) by mouth 2 (two) times daily. for 5 days, Disp: 10 capsule, Rfl: 0    valsartan (DIOVAN) 160 MG tablet, Take 1 tablet (160 mg total) by mouth once daily., Disp: 90 tablet, Rfl: 3      "   Assessment:       1. Acute bronchitis, unspecified organism    2. Benign essential HTN    3. Flu           Plan:       Acute bronchitis, unspecified organism  -     POCT Influenza A/B Molecular    Benign essential HTN  -     valsartan (DIOVAN) 160 MG tablet; Take 1 tablet (160 mg total) by mouth once daily.  Dispense: 90 tablet; Refill: 3    Flu  -     oseltamivir (TAMIFLU) 75 MG capsule; Take 1 capsule (75 mg total) by mouth 2 (two) times daily. for 5 days  Dispense: 10 capsule; Refill: 0  - Conservative care recommendations provided    RTC PRN

## 2025-01-27 ENCOUNTER — TELEPHONE (OUTPATIENT)
Dept: PRIMARY CARE CLINIC | Facility: CLINIC | Age: 35
End: 2025-01-27
Payer: COMMERCIAL

## 2025-01-27 NOTE — LETTER
January 27, 2025      Prabhakar Moss  [address] [address]             [facility] [id number]  [facility] [address]  [facility] [address]  Phone: [phone]  Fax: [phone] Patient: Prabhakar Moss  MRN: [id number]  YOB: 1990  Date of Visit: 01/25/2025    To Whom It May Concern:    Prabhakar Wyatt was seen in the Primary Care  Clinic on 01/25/2025. He may return to work on 1/29/2025 with no restrictions. If you have any questions or concerns, or if I can be of further assistance, please do not hesitate to contact my office.    Sincerely,      Khari Vargas MD  [facility]

## 2025-02-21 ENCOUNTER — PATIENT MESSAGE (OUTPATIENT)
Dept: ADMINISTRATIVE | Facility: HOSPITAL | Age: 35
End: 2025-02-21
Payer: COMMERCIAL

## 2025-03-30 DIAGNOSIS — I10 BENIGN ESSENTIAL HTN: ICD-10-CM

## 2025-03-30 NOTE — TELEPHONE ENCOUNTER
Care Due:                  Date            Visit Type   Department     Provider  --------------------------------------------------------------------------------                                MYCHART                              ANNUAL                              CHECKUP/PHY  [facility]  Last Visit: 01-      S            PRIMARY CARE   Khari Vargas  Next Visit: None Scheduled  None         None Found                                                            Last  Test          Frequency    Reason                     Performed    Due Date  --------------------------------------------------------------------------------    CMP.........  12 months..  valsartan................  05- 05-    [facility] Embedded Care Due Messages. Reference number: [id number].   3/30/2025 10:48:55 AM CDT

## 2025-03-31 RX ORDER — VALSARTAN 160 MG/1
160 TABLET ORAL DAILY
Qty: 90 TABLET | Refills: 0 | Status: SHIPPED | OUTPATIENT
Start: 2025-03-31

## 2025-03-31 NOTE — TELEPHONE ENCOUNTER
Refill Routing Note   Medication(s) are not appropriate for processing by [facility] for the following reason(s):        Required vitals abnormal    [facility] action(s):  Defer     Requires labs : Yes             Appointments  past 12m or future 3m with PCP    Date Provider   Last Visit   1/25/2025 Khari Vargas MD   Next Visit   Visit date not found Khari Vargas MD   ED visits in past 90 days: 0        Note composed:11:08 AM 03/31/2025

## 2025-04-24 DIAGNOSIS — I10 BENIGN ESSENTIAL HTN: ICD-10-CM

## 2025-04-24 RX ORDER — VALSARTAN 160 MG/1
160 TABLET ORAL DAILY
Qty: 90 TABLET | Refills: 1 | Status: SHIPPED | OUTPATIENT
Start: 2025-04-24

## 2025-04-24 NOTE — TELEPHONE ENCOUNTER
No care due was identified.  [facility] Embedded Care Due Messages. Reference number: [id number].   4/24/2025 12:25:50 PM CDT

## 2025-05-13 ENCOUNTER — PATIENT MESSAGE (OUTPATIENT)
Dept: ADMINISTRATIVE | Facility: HOSPITAL | Age: 35
End: 2025-05-13
Payer: COMMERCIAL

## 2025-07-29 ENCOUNTER — PATIENT MESSAGE (OUTPATIENT)
Dept: ADMINISTRATIVE | Facility: HOSPITAL | Age: 35
End: 2025-07-29
Payer: COMMERCIAL